# Patient Record
Sex: FEMALE | Race: WHITE
[De-identification: names, ages, dates, MRNs, and addresses within clinical notes are randomized per-mention and may not be internally consistent; named-entity substitution may affect disease eponyms.]

---

## 2018-04-18 ENCOUNTER — HOSPITAL ENCOUNTER (EMERGENCY)
Dept: HOSPITAL 8 - ED | Age: 33
Discharge: HOME | End: 2018-04-18
Payer: COMMERCIAL

## 2018-04-18 VITALS — DIASTOLIC BLOOD PRESSURE: 89 MMHG | SYSTOLIC BLOOD PRESSURE: 149 MMHG

## 2018-04-18 VITALS — WEIGHT: 199.3 LBS | BODY MASS INDEX: 29.52 KG/M2 | HEIGHT: 69 IN

## 2018-04-18 DIAGNOSIS — S61.431A: Primary | ICD-10-CM
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
## 2018-04-18 PROCEDURE — 90715 TDAP VACCINE 7 YRS/> IM: CPT

## 2018-04-18 PROCEDURE — 99283 EMERGENCY DEPT VISIT LOW MDM: CPT

## 2018-04-18 PROCEDURE — 96372 THER/PROPH/DIAG INJ SC/IM: CPT

## 2020-07-21 ENCOUNTER — OFFICE VISIT (OUTPATIENT)
Dept: MEDICAL GROUP | Facility: PHYSICIAN GROUP | Age: 35
End: 2020-07-21
Payer: COMMERCIAL

## 2020-07-21 VITALS
SYSTOLIC BLOOD PRESSURE: 118 MMHG | DIASTOLIC BLOOD PRESSURE: 76 MMHG | HEART RATE: 77 BPM | TEMPERATURE: 98.9 F | OXYGEN SATURATION: 97 % | WEIGHT: 207 LBS | BODY MASS INDEX: 30.66 KG/M2 | HEIGHT: 69 IN

## 2020-07-21 DIAGNOSIS — F41.9 ANXIETY: ICD-10-CM

## 2020-07-21 DIAGNOSIS — Z87.42 HISTORY OF ABNORMAL CERVICAL PAP SMEAR: ICD-10-CM

## 2020-07-21 DIAGNOSIS — Z87.42 HISTORY OF OVARIAN CYST: ICD-10-CM

## 2020-07-21 DIAGNOSIS — R07.89 CHEST DISCOMFORT: ICD-10-CM

## 2020-07-21 DIAGNOSIS — Z97.5 IUD (INTRAUTERINE DEVICE) IN PLACE: ICD-10-CM

## 2020-07-21 DIAGNOSIS — Z86.59 HISTORY OF DEPRESSION: ICD-10-CM

## 2020-07-21 PROCEDURE — 99204 OFFICE O/P NEW MOD 45 MIN: CPT | Performed by: FAMILY MEDICINE

## 2020-07-21 SDOH — HEALTH STABILITY: MENTAL HEALTH: HOW MANY STANDARD DRINKS CONTAINING ALCOHOL DO YOU HAVE ON A TYPICAL DAY?: 1 OR 2

## 2020-07-21 SDOH — HEALTH STABILITY: MENTAL HEALTH: HOW OFTEN DO YOU HAVE A DRINK CONTAINING ALCOHOL?: 2-3 TIMES A WEEK

## 2020-07-21 ASSESSMENT — PATIENT HEALTH QUESTIONNAIRE - PHQ9: CLINICAL INTERPRETATION OF PHQ2 SCORE: 0

## 2020-07-21 NOTE — PROGRESS NOTES
"CC: Anxiety, chest discomfort    HISTORY OF THE PRESENT ILLNESS: Patient is a 34 y.o. female. This pleasant patient is here today to establish care and discuss the following issues.    Patient is here today to establish care.  She is hoping to get a Pap smear soon as well.  In reviewing her health history:    She does have a history of abnormal Pap smear maybe 10 years ago.  Reports consecutive Paps since that time have been normal.  No concerns or symptoms from a well woman standpoint at this time.  She does have an IUD in place (ParaGard).  She also has a history of a ruptured ovarian cyst.  No recent issues with that.    Notes that her biggest health concern currently is anxiety and depression.  She reports that many years ago she was actually seriously depressed and admitted to [facility].  At that time she was on antidepressants.  States that she weaned herself off of these several years ago.  Does not desire to go back on antidepressants or any psychiatric medication.  She is now however experiencing significant anxiety.  She was previously seeing a counselor in [place] where she lived, but is not seeing one here.  Again, she strongly desires not to do medications and prefers natural treatments such as meditation, self awareness.  She does note that she gets \"heart pain\", just before panic attacks.  She told this to previous doctor and never had an EKG or any work-up.  Just describes it as a heavy chest discomfort which typically precedes a severe panic attack.  Denies any other associated symptoms other than shortness of breath as the panic attack progresses.    Allergies: Patient has no known allergies.    No current [facility]-ordered outpatient medications on file.     No current [facility]-ordered facility-administered medications on file.        History reviewed. No pertinent past medical history.    History reviewed. No pertinent surgical history.    Social History     Tobacco Use   • Smoking " "status: Former Smoker   • Smokeless tobacco: Never Used   Substance Use Topics   • Alcohol use: Yes     Frequency: 2-3 times a week     Drinks per session: 1 or 2   • Drug use: Not Currently       Social History     Social History Narrative   • Not on file       Family History   Problem Relation Age of Onset   • No Known Problems Mother    • Diabetes Father         type II   • Depression Father    • Cancer Maternal Grandmother         colon cancer   • Cancer Maternal Grandfather         colon cancer       ROS:     - Constitutional: Negative for fever, chills, unexpected weight change, and fatigue/generalized weakness.     - HEENT: Negative for headaches, vision changes, hearing changes, ear pain, ear discharge, rhinorrhea, sinus congestion, sore throat, and neck pain.      - Respiratory: Negative for cough, sputum production, chest congestion, dyspnea, wheezing, and crackles.      - Cardiovascular: Positive for chest discomfort.  Negative for  palpitations, orthopnea, PND, and bilateral lower extremity edema.     - Gastrointestinal: Negative for heartburn, nausea, vomiting, abdominal pain, hematochezia, melena, diarrhea, constipation, and greasy/foul-smelling stools.     - Genitourinary: Negative for dysuria, polyuria, hematuria, pyuria, urinary urgency, and urinary incontinence.     - Musculoskeletal: Negative for myalgias, back pain, and joint pain.     - Skin: Negative for rash, itching, cyanotic skin color change.       - NOTE: All other systems reviewed and are negative, except as in HPI.    Exam: /76 (BP Location: Left arm, Patient Position: Sitting, BP Cuff Size: Adult)   Pulse 77   Temp 37.2 °C (98.9 °F) (Temporal)   Ht 1.753 m (5' 9\")   Wt 93.9 kg (207 lb)   SpO2 97%  Body mass index is 30.57 kg/m².    General: Well appearing, NAD  HEENT: Normocephalic. Conjunctiva clear, lids without ptosis, pupils equal and reactive to light accommodation, ears normal shape and contour, canals are clear " bilaterally, tympanic membranes without bulging or erythema and normal cone of light  Neck: Supple without JVD. No thyromegaly or nodules  Pulmonary: Clear to ausculation.  Normal effort. No rales, ronchi, or wheezing.  Cardiovascular: Regular rate and rhythm without murmur, rubs or gallop.   Abdomen: Soft, nontender, nondistended. Normal bowel sounds. Liver and spleen are not palpable. No rebound or guarding  Neurologic: normal gait  Lymph: No cervical, supraclavicular lymph nodes are palpable  Skin: Warm and dry.  No obvious lesions.  Musculoskeletal:  No extremity cyanosis, clubbing, or edema.  Psych: Normal mood and affect. Alert and oriented. Judgment and insight is normal.    Please note that this dictation was created using voice recognition software. I have made every reasonable attempt to correct obvious errors, but I expect that there are errors of grammar and possibly content that I did not discover before finalizing the note.      Assessment/Plan  Joycelyn was seen today for establish care, annual exam and anxiety.    Diagnoses and all orders for this visit:    Anxiety  History of depression  Chronic issues, more so anxiety than depression at this time.  Strongly prefers to stay away from medications.  Recommend seeing if she can reconnect with her previous therapist, possibly be a virtual visit since those are widely available right now.  Also discussed various techniques which may improve relaxation or help stop panic attacks.    History of abnormal cervical Pap smear  History of ovarian cyst  IUD (intrauterine device) in place  Patient will return in about 1 to 2 weeks for Pap smear.    Chest discomfort  Immediately proceeds panic attacks.  I feel patient would have some reassurance from a normal EKG, which it was.  I do suspect anxiety related.  We will continue to monitor.    -     EKG    EKG Interpretation 7/21/2020  Interpreted by me   Rhythm: normal sinus   Rate: 71   Axis: normal  Ectopy: none    Conduction: normal   ST Segments: no acute change   T Waves: no acute change   Q Waves: none   Clinical Impression: no acute changes and normal EKG    See EKG scanned chart.     Follow up in one week for pap smear.     Nancy Dillard DO  [facility]

## 2020-07-28 ENCOUNTER — OFFICE VISIT (OUTPATIENT)
Dept: MEDICAL GROUP | Facility: PHYSICIAN GROUP | Age: 35
End: 2020-07-28
Payer: COMMERCIAL

## 2020-07-28 ENCOUNTER — HOSPITAL ENCOUNTER (OUTPATIENT)
Facility: MEDICAL CENTER | Age: 35
End: 2020-07-28
Attending: FAMILY MEDICINE
Payer: COMMERCIAL

## 2020-07-28 VITALS
HEIGHT: 69 IN | OXYGEN SATURATION: 97 % | BODY MASS INDEX: 30.07 KG/M2 | HEART RATE: 86 BPM | TEMPERATURE: 97.2 F | WEIGHT: 203 LBS | DIASTOLIC BLOOD PRESSURE: 64 MMHG | SYSTOLIC BLOOD PRESSURE: 116 MMHG

## 2020-07-28 DIAGNOSIS — Z12.4 PAP SMEAR FOR CERVICAL CANCER SCREENING: ICD-10-CM

## 2020-07-28 PROCEDURE — 87624 HPV HI-RISK TYP POOLED RSLT: CPT

## 2020-07-28 PROCEDURE — 99395 PREV VISIT EST AGE 18-39: CPT | Performed by: FAMILY MEDICINE

## 2020-07-28 PROCEDURE — 88175 CYTOPATH C/V AUTO FLUID REDO: CPT

## 2020-07-28 NOTE — PROGRESS NOTES
"CC: Pap smear    HISTORY OF THE PRESENT ILLNESS: Patient is a 34 y.o. female. This pleasant patient is here today for Pap smear and well woman exam.    Patient is here today for Pap smear and well woman exam.  She has no gynecologic concerns at this time.  She does have a history of ovarian cyst, but has not had any issues with that for quite some time.  Denies pelvic pain.  She does have a copper IUD in place.  Menstrual cycles are somewhat heavy but regular and not painful.  She does note an abnormal Pap smear several years ago, but reports that she had normal 1 since that time.  Denies immediate family history of breast or gynecologic cancers.  Denies pelvic pain, dysuria, vaginal discharge.    Allergies: Patient has no known allergies.    No current [facility]-ordered outpatient medications on file.     No current [facility]-ordered facility-administered medications on file.        History reviewed. No pertinent past medical history.    History reviewed. No pertinent surgical history.    Social History     Tobacco Use   • Smoking status: Former Smoker   • Smokeless tobacco: Never Used   Substance Use Topics   • Alcohol use: Yes     Frequency: 2-3 times a week     Drinks per session: 1 or 2   • Drug use: Not Currently       Social History     Social History Narrative   • Not on file       Family History   Problem Relation Age of Onset   • No Known Problems Mother    • Diabetes Father         type II   • Depression Father    • Cancer Maternal Grandmother         colon cancer   • Cancer Maternal Grandfather         colon cancer       ROS:   See HPI    Exam: /64 (BP Location: Right arm, Patient Position: Sitting, BP Cuff Size: Adult)   Pulse 86   Temp 36.2 °C (97.2 °F) (Temporal)   Ht 1.753 m (5' 9\")   Wt 92.1 kg (203 lb)   SpO2 97%  Body mass index is 29.98 kg/m².    General: Well appearing, NAD  Breast: Breasts normal in appearance.  No palpable lumps or masses.  No axillary lymphadenopathy.  : Normal external " genitalia.  Normal cervix and vagina.  No cervical motion tenderness, adnexal tenderness, adnexal masses.  IUD strings noted at cervical os.   Skin: Warm and dry.  No obvious lesions.  Musculoskeletal:  No extremity cyanosis, clubbing, or edema.  Psych: Normal mood and affect. Alert and oriented. Judgment and insight is normal.    Please note that this dictation was created using voice recognition software. I have made every reasonable attempt to correct obvious errors, but I expect that there are errors of grammar and possibly content that I did not discover before finalizing the note.      Assessment/Plan  Joycelyn was seen today for gynecologic exam.    Diagnoses and all orders for this visit:    Pap smear for cervical cancer screening  Pap smear and well woman exam performed today.  No concerns on review of systems or exam.  -     THINPREP PAP WITH HPV; Future      Follow-up in 1 year or sooner if needed.    Nancy Dillard,   [facility]

## 2020-07-29 LAB
CYTOLOGY REG CYTOL: NORMAL
HPV HR 12 DNA CVX QL NAA+PROBE: NEGATIVE
HPV16 DNA SPEC QL NAA+PROBE: NEGATIVE
HPV18 DNA SPEC QL NAA+PROBE: NEGATIVE
SPECIMEN SOURCE: NORMAL

## 2020-07-31 ENCOUNTER — TELEPHONE (OUTPATIENT)
Dept: MEDICAL GROUP | Facility: PHYSICIAN GROUP | Age: 35
End: 2020-07-31

## 2020-07-31 NOTE — TELEPHONE ENCOUNTER
----- Message from Nancy Dillard D.O. sent at 7/31/2020  7:50 AM PDT -----  Please call patient and let her know her Pap smear was normal.

## 2021-05-23 ENCOUNTER — OFFICE VISIT (OUTPATIENT)
Dept: URGENT CARE | Facility: PHYSICIAN GROUP | Age: 36
End: 2021-05-23
Payer: COMMERCIAL

## 2021-05-23 VITALS
HEART RATE: 91 BPM | DIASTOLIC BLOOD PRESSURE: 80 MMHG | TEMPERATURE: 99.6 F | HEIGHT: 69 IN | SYSTOLIC BLOOD PRESSURE: 138 MMHG | RESPIRATION RATE: 16 BRPM | WEIGHT: 210 LBS | BODY MASS INDEX: 31.1 KG/M2 | OXYGEN SATURATION: 100 %

## 2021-05-23 DIAGNOSIS — R20.2 NUMBNESS AND TINGLING SENSATION OF SKIN: ICD-10-CM

## 2021-05-23 DIAGNOSIS — R20.0 NUMBNESS AND TINGLING SENSATION OF SKIN: ICD-10-CM

## 2021-05-23 LAB — GLUCOSE BLD-MCNC: 94 MG/DL (ref 70–100)

## 2021-05-23 PROCEDURE — 99213 OFFICE O/P EST LOW 20 MIN: CPT | Performed by: FAMILY MEDICINE

## 2021-05-23 PROCEDURE — 82962 GLUCOSE BLOOD TEST: CPT | Performed by: FAMILY MEDICINE

## 2021-05-23 NOTE — PROGRESS NOTES
"Subjective:      Joycelyn Awan is a 35 y.o. female who presents with Head Pain (pins and needle feeling all over body and head, x3 weeks pinching feeling x1 month )    - This is a pleasant and nontoxic appearing 35 y.o. female with c/o 4 wks w/ intermittent pinching feeling on top of head and over past 2 wks feels random spots of body tingling, jumps around from one past of body to another. Otherwise feels well except maybe a little forgetful foggy headed and maybe some increased recent stress. No associated NVFC, no focal limb weakness numbness heaviness or vision changes. No recent head trauma.       ALLERGIES:  Patient has no known allergies.     PMH:  History reviewed. No pertinent past medical history.     PSH:  History reviewed. No pertinent surgical history.    MEDS:  No current outpatient medications on file.    ** I have documented what I find to be significant in regards to past medical, social, family and surgical history  in my HPI or under PMH/PSH/FH review section, otherwise it is noncontributory **             HPI    Review of Systems   All other systems reviewed and are negative.         Objective:     /80 (BP Location: Right arm, Patient Position: Sitting, BP Cuff Size: Adult)   Pulse 91   Temp 37.6 °C (99.6 °F) (Temporal)   Resp 16   Ht 1.753 m (5' 9\")   Wt 95.3 kg (210 lb)   SpO2 100%   BMI 31.01 kg/m²      Physical Exam  Vitals and nursing note reviewed.   Constitutional:       General: She is not in acute distress.     Appearance: Normal appearance. She is well-developed.   HENT:      Head: Normocephalic and atraumatic.   Eyes:      General: No scleral icterus.  Cardiovascular:      Heart sounds: Normal heart sounds. No murmur heard.     Pulmonary:      Effort: Pulmonary effort is normal. No respiratory distress.      Breath sounds: Normal breath sounds.   Skin:     Coloration: Skin is not jaundiced or pale.   Neurological:      Mental Status: She is alert.      Motor: No " abnormal muscle tone.   Psychiatric:         Mood and Affect: Mood normal.         Behavior: Behavior normal.          Assessment/Plan:          1. Numbness and tingling sensation of skin  REFERRAL TO NEUROLOGY    CBC WITH DIFFERENTIAL    Comp Metabolic Panel    TSH WITH REFLEX TO FT4    POCT Glucose       - Dx, plan & d/c instructions discussed w/ patient and/or family members  - Rest, stay hydrated   - E.R. precautions discussed     Follow up with their PCP in 2-3 days, ER if not improving or feeling/getting worse.    Any realistic side effects of medications that may have been given today reviewed.     Patient left in stable condition       Please note that this dictation was created using voice recognition software. I have made every reasonable attempt to correct obvious errors, but I expect that there are errors of grammar and possibly content that I did not discover before finalizing the note.

## 2021-05-24 ENCOUNTER — HOSPITAL ENCOUNTER (OUTPATIENT)
Dept: LAB | Facility: MEDICAL CENTER | Age: 36
End: 2021-05-24
Attending: FAMILY MEDICINE
Payer: COMMERCIAL

## 2021-05-24 DIAGNOSIS — R20.0 NUMBNESS AND TINGLING SENSATION OF SKIN: ICD-10-CM

## 2021-05-24 DIAGNOSIS — R20.2 NUMBNESS AND TINGLING SENSATION OF SKIN: ICD-10-CM

## 2021-05-24 LAB
ALBUMIN SERPL BCP-MCNC: 4.2 G/DL (ref 3.2–4.9)
ALBUMIN/GLOB SERPL: 1.5 G/DL
ALP SERPL-CCNC: 43 U/L (ref 30–99)
ALT SERPL-CCNC: 12 U/L (ref 2–50)
ANION GAP SERPL CALC-SCNC: 8 MMOL/L (ref 7–16)
AST SERPL-CCNC: 12 U/L (ref 12–45)
BASOPHILS # BLD AUTO: 0.4 % (ref 0–1.8)
BASOPHILS # BLD: 0.02 K/UL (ref 0–0.12)
BILIRUB SERPL-MCNC: 0.4 MG/DL (ref 0.1–1.5)
BUN SERPL-MCNC: 10 MG/DL (ref 8–22)
CALCIUM SERPL-MCNC: 9 MG/DL (ref 8.5–10.5)
CHLORIDE SERPL-SCNC: 107 MMOL/L (ref 96–112)
CO2 SERPL-SCNC: 24 MMOL/L (ref 20–33)
CREAT SERPL-MCNC: 0.76 MG/DL (ref 0.5–1.4)
EOSINOPHIL # BLD AUTO: 0.08 K/UL (ref 0–0.51)
EOSINOPHIL NFR BLD: 1.6 % (ref 0–6.9)
ERYTHROCYTE [DISTWIDTH] IN BLOOD BY AUTOMATED COUNT: 47.2 FL (ref 35.9–50)
GLOBULIN SER CALC-MCNC: 2.8 G/DL (ref 1.9–3.5)
GLUCOSE SERPL-MCNC: 97 MG/DL (ref 65–99)
HCT VFR BLD AUTO: 46.4 % (ref 37–47)
HGB BLD-MCNC: 15.1 G/DL (ref 12–16)
IMM GRANULOCYTES # BLD AUTO: 0.01 K/UL (ref 0–0.11)
IMM GRANULOCYTES NFR BLD AUTO: 0.2 % (ref 0–0.9)
LYMPHOCYTES # BLD AUTO: 1.74 K/UL (ref 1–4.8)
LYMPHOCYTES NFR BLD: 35.8 % (ref 22–41)
MCH RBC QN AUTO: 32.5 PG (ref 27–33)
MCHC RBC AUTO-ENTMCNC: 32.5 G/DL (ref 33.6–35)
MCV RBC AUTO: 100 FL (ref 81.4–97.8)
MONOCYTES # BLD AUTO: 0.33 K/UL (ref 0–0.85)
MONOCYTES NFR BLD AUTO: 6.8 % (ref 0–13.4)
NEUTROPHILS # BLD AUTO: 2.68 K/UL (ref 2–7.15)
NEUTROPHILS NFR BLD: 55.2 % (ref 44–72)
NRBC # BLD AUTO: 0 K/UL
NRBC BLD-RTO: 0 /100 WBC
PLATELET # BLD AUTO: 169 K/UL (ref 164–446)
PMV BLD AUTO: 12.2 FL (ref 9–12.9)
POTASSIUM SERPL-SCNC: 4.4 MMOL/L (ref 3.6–5.5)
PROT SERPL-MCNC: 7 G/DL (ref 6–8.2)
RBC # BLD AUTO: 4.64 M/UL (ref 4.2–5.4)
SODIUM SERPL-SCNC: 139 MMOL/L (ref 135–145)
TSH SERPL DL<=0.005 MIU/L-ACNC: 1.22 UIU/ML (ref 0.38–5.33)
WBC # BLD AUTO: 4.9 K/UL (ref 4.8–10.8)

## 2021-05-24 PROCEDURE — 84443 ASSAY THYROID STIM HORMONE: CPT

## 2021-05-24 PROCEDURE — 85025 COMPLETE CBC W/AUTO DIFF WBC: CPT

## 2021-05-24 PROCEDURE — 80053 COMPREHEN METABOLIC PANEL: CPT

## 2021-05-24 PROCEDURE — 36415 COLL VENOUS BLD VENIPUNCTURE: CPT

## 2021-05-26 ENCOUNTER — APPOINTMENT (OUTPATIENT)
Dept: RADIOLOGY | Facility: MEDICAL CENTER | Age: 36
End: 2021-05-26
Attending: EMERGENCY MEDICINE
Payer: COMMERCIAL

## 2021-05-26 ENCOUNTER — HOSPITAL ENCOUNTER (EMERGENCY)
Facility: MEDICAL CENTER | Age: 36
End: 2021-05-26
Attending: EMERGENCY MEDICINE
Payer: COMMERCIAL

## 2021-05-26 VITALS
DIASTOLIC BLOOD PRESSURE: 76 MMHG | TEMPERATURE: 99 F | SYSTOLIC BLOOD PRESSURE: 125 MMHG | WEIGHT: 210 LBS | BODY MASS INDEX: 31.1 KG/M2 | OXYGEN SATURATION: 99 % | HEART RATE: 78 BPM | RESPIRATION RATE: 16 BRPM | HEIGHT: 69 IN

## 2021-05-26 DIAGNOSIS — R20.2 PARESTHESIAS: ICD-10-CM

## 2021-05-26 DIAGNOSIS — R51.9 ACUTE INTRACTABLE HEADACHE, UNSPECIFIED HEADACHE TYPE: ICD-10-CM

## 2021-05-26 PROCEDURE — 99283 EMERGENCY DEPT VISIT LOW MDM: CPT

## 2021-05-26 PROCEDURE — A9270 NON-COVERED ITEM OR SERVICE: HCPCS | Performed by: EMERGENCY MEDICINE

## 2021-05-26 PROCEDURE — 70551 MRI BRAIN STEM W/O DYE: CPT

## 2021-05-26 PROCEDURE — 700102 HCHG RX REV CODE 250 W/ 637 OVERRIDE(OP): Performed by: EMERGENCY MEDICINE

## 2021-05-26 RX ORDER — DIAZEPAM 5 MG/1
5 TABLET ORAL ONCE
Status: COMPLETED | OUTPATIENT
Start: 2021-05-26 | End: 2021-05-26

## 2021-05-26 RX ORDER — ACETAMINOPHEN 325 MG/1
975 TABLET ORAL ONCE
Status: COMPLETED | OUTPATIENT
Start: 2021-05-26 | End: 2021-05-26

## 2021-05-26 RX ADMIN — DIAZEPAM 5 MG: 5 TABLET ORAL at 16:30

## 2021-05-26 RX ADMIN — ACETAMINOPHEN 975 MG: 325 TABLET, FILM COATED ORAL at 15:50

## 2021-05-26 ASSESSMENT — FIBROSIS 4 INDEX: FIB4 SCORE: 0.74

## 2021-05-26 ASSESSMENT — PAIN DESCRIPTION - PAIN TYPE: TYPE: ACUTE PAIN

## 2021-05-26 NOTE — ED PROVIDER NOTES
"ED Provider Note    CHIEF COMPLAINT  Chief Complaint   Patient presents with   • Headache     generalized pressure x 1 month intermittent. reports whole body tingling.       HPI  Joycelyn Awan is a 35 y.o. female who presents with past medical history significant for anxiety.  She for 1 month has had headaches that are intermittent and various paresthesias in her body.  She describes feeling numbness and tingling in different parts and then occasionally visual disturbances.  The patient denies any focal strokelike symptoms in 1 part of her body that is persistent.  The patient is concerned that she may have multiple sclerosis.  She denies any fever or other symptoms.  The patient was referred to both a psychiatrist for anxiety and to a neurologist but she has not been able to establish an appointment with either at this time.    REVIEW OF SYSTEMS  See HPI for further details. All other systems are negative.     PAST MEDICAL HISTORY   Anxiety    SOCIAL HISTORY  Social History     Tobacco Use   • Smoking status: Former Smoker   • Smokeless tobacco: Never Used   Vaping Use   • Vaping Use: Never used   Substance and Sexual Activity   • Alcohol use: Yes   • Drug use: Not Currently   • Sexual activity: Not on file       SURGICAL HISTORY  patient denies any surgical history    CURRENT MEDICATIONS  Home Medications     Reviewed by Malia Chahal R.N. (Registered Nurse) on 05/26/21 at 1322  Med List Status: Not Addressed   Medication Last Dose Status        Patient Francisco Taking any Medications                       ALLERGIES  No Known Allergies    PHYSICAL EXAM  VITAL SIGNS: /98   Pulse 87   Temp 36.9 °C (98.5 °F) (Temporal)   Resp 16   Ht 1.753 m (5' 9\")   Wt 95.3 kg (210 lb)   SpO2 100%   BMI 31.01 kg/m²  @TRAN[[id number]::@   Pulse ox interpretation: I interpret this pulse ox as normal.  Constitutional: Alert.  HENT: No signs of trauma, Bilateral external ears normal, Nose normal.   Eyes: Pupils are " equal and reactive, Conjunctiva normal, Non-icteric.   Neck: Normal range of motion, No tenderness, Supple, No stridor.   Lymphatic: No lymphadenopathy noted.   Cardiovascular: Regular rate and rhythm, no murmurs.   Thorax & Lungs: Normal breath sounds, No respiratory distress, No wheezing, No chest tenderness.   Abdomen: Bowel sounds normal, Soft, No tenderness, No masses, No pulsatile masses. No peritoneal signs.  Skin: Warm, Dry, No erythema, No rash.   Back: No bony tenderness, No CVA tenderness.   Extremities: Intact distal pulses, No edema, No tenderness, No cyanosis.  Musculoskeletal: Good range of motion in all major joints. No tenderness to palpation or major deformities noted.   Neurologic: Alert , Normal motor function, Normal sensory function, No focal deficits noted.   Psychiatric: Affect normal, Judgment normal, Mood normal.       DIAGNOSTIC STUDIES / PROCEDURES        RADIOLOGY  MR-BRAIN-W/O    (Results Pending)           COURSE & MEDICAL DECISION MAKING  Pertinent Labs & Imaging studies reviewed. (See chart for details)    Differential diagnosis: Multiple sclerosis or other demyelinating disease, anxiety    I reviewed the patient's chart, the patient 2 days ago on May 24, 2021 had a normal CBC, a normal CMP, and normal thyroid test.    I have ordered an MRI to assess the patient for possible demyelination disease.  She is given Tylenol 975 mg p.o. for headache.    MRI brain is pending, the case is signed out to Dr. Marta Cowart for final disposition.      FINAL IMPRESSION  1.  Headache and paresthesias and visual disturbance  2.   3.         Electronically signed by: Zach Millan M.D., 5/26/2021 3:05 PM

## 2021-05-26 NOTE — ED TRIAGE NOTES
"Chief Complaint   Patient presents with   • Headache     generalized pressure x 1 month intermittent. reports whole body tingling.       Pt amb to triage with steady gait for above complaint. Neuro intact. Reports headaches began every few days and now reports they are daily. Denies photophobia, N/V.    Referred to neurology, waiting for insurance approval.     Pt is alert and oriented, speaking in full sentences, follows commands and responds appropriately to questions. Resp are even and unlabored. No behavioral indicators of pain.   Pt placed in lobby. Pt educated on triage process. Pt encouraged to alert staff for any changes.    /94   Pulse 87   Temp 36.9 °C (98.5 °F) (Temporal)   Resp 16   Ht 1.753 m (5' 9\")   Wt 95.3 kg (210 lb)   SpO2 100%   BMI 31.01 kg/m²         "

## 2021-05-27 NOTE — DISCHARGE INSTRUCTIONS
Follow-up with neurology and psychiatry as scheduled    Follow-up with your primary care doctor    Return to the ER for worsening symptoms

## 2021-05-27 NOTE — ED PROVIDER NOTES
6:48 PM  I received signout from Dr. Blackwell who asked me to follow-up on MRI results.  MRI is negative for acute pathology.  Patient will be discharged.

## 2021-05-27 NOTE — ED NOTES
Patient discharged. Patient provided information about headaches. Pt educated to follow-up w/ neurology, PCP, and to return to the ER w/ any worsening symptoms. Pt walked to the lobby w/ spouse.

## 2021-06-04 ENCOUNTER — OFFICE VISIT (OUTPATIENT)
Dept: MEDICAL GROUP | Facility: PHYSICIAN GROUP | Age: 36
End: 2021-06-04
Payer: COMMERCIAL

## 2021-06-04 VITALS
TEMPERATURE: 99 F | HEIGHT: 69 IN | HEART RATE: 93 BPM | OXYGEN SATURATION: 97 % | BODY MASS INDEX: 30.66 KG/M2 | SYSTOLIC BLOOD PRESSURE: 122 MMHG | DIASTOLIC BLOOD PRESSURE: 64 MMHG | WEIGHT: 207 LBS

## 2021-06-04 DIAGNOSIS — R51.9 NONINTRACTABLE EPISODIC HEADACHE, UNSPECIFIED HEADACHE TYPE: ICD-10-CM

## 2021-06-04 DIAGNOSIS — H53.8 BLURRY VISION: ICD-10-CM

## 2021-06-04 DIAGNOSIS — R20.2 PINS AND NEEDLES SENSATION: ICD-10-CM

## 2021-06-04 PROCEDURE — 99213 OFFICE O/P EST LOW 20 MIN: CPT | Performed by: FAMILY MEDICINE

## 2021-06-04 ASSESSMENT — PATIENT HEALTH QUESTIONNAIRE - PHQ9: CLINICAL INTERPRETATION OF PHQ2 SCORE: 0

## 2021-06-04 ASSESSMENT — FIBROSIS 4 INDEX: FIB4 SCORE: 0.74

## 2021-06-04 NOTE — PROGRESS NOTES
"CC: Headache, pins-and-needles sensation, blurry vision    HISTORY OF THE PRESENT ILLNESS: Patient is a 35 y.o. female.     Patient notes in the past several weeks she has developed symptoms of pins-and-needles at random points on her body in addition to headache and most recently blurry vision.  She has been seen at both urgent care and emergency department.  She had labs which included CBC, CMP and thyroid studies which were normal.  She also had brain MRI which was normal.  She was told in the emergency department that she is likely experiencing migraines.    She subsequently saw a naturopath who told her that her symptoms were related to mold.    She questions whether or not migraines are not appropriate diagnosis as she feels that there must be some underlying other cause for her symptoms.  She notes she has been under increased stress lately and has been seeing a therapist.    Neurology referral    Allergies: Patient has no known allergies.    No current [facility]-ordered outpatient medications on file.     No current [facility]-ordered facility-administered medications on file.       Past Medical History:   Diagnosis Date   • Functional ovarian cysts        History reviewed. No pertinent surgical history.    Social History     Tobacco Use   • Smoking status: Former Smoker   • Smokeless tobacco: Never Used   Vaping Use   • Vaping Use: Never used   Substance Use Topics   • Alcohol use: Yes   • Drug use: Not Currently     Types: Marijuana     Comment: passive       Family History   Problem Relation Age of Onset   • No Known Problems Mother    • Diabetes Father         type II   • Depression Father    • Cancer Maternal Grandmother         colon cancer   • Cancer Maternal Grandfather         colon cancer   • Lung Disease Father        ROS:   See HPI    Exam: /64 (BP Location: Left arm, Patient Position: Sitting, BP Cuff Size: Large adult)   Pulse 93   Temp 37.2 °C (99 °F) (Temporal)   Ht 1.753 m (5' 9\")   Wt 93.9 " kg (207 lb)   SpO2 97%  Body mass index is 30.57 kg/m².    General: Well appearing, NAD  Skin: Warm and dry.  No obvious lesions.   Musculoskeletal:  No extremity cyanosis, clubbing, or edema.  Psych: Normal mood and affect. Alert and oriented. Judgment and insight is normal.    Please note that this dictation was created using voice recognition software. I have made every reasonable attempt to correct obvious errors, but I expect that there are errors of grammar and possibly content that I did not discover before finalizing the note.      Assessment/Plan  Joycelyn was seen today for follow-up.    Diagnoses and all orders for this visit:    Nonintractable episodic headache, unspecified headache type  Blurry vision  Pins and needles sensation   New problems, symptoms very consistent with migraine.  She was referred to neurology by urgent care but has not yet been able to schedule due to possible change of insurance at the end of this month.  We will call the referral department to find out what is going on.  Patient believes that symptoms are either related to mold per naturopath or that something else underlying may be causing the symptoms.  We did discuss that onset of migraines is most often random.  We also discussed triggers for migraines such as weather, dietary intake, stress, menstrual cycles.  She may keep a log of her symptoms to see if she can identify any triggers.  She states she does not like to take medication so declines any medication for treatment of migraine.  We did talk about medication such as Imitrex, if she changes her mind she can send me a [employer] message and I will send in the prescription.    Follow up as needed.     My total time spent caring for the patient on the day of the encounter was 25 minutes.     This does not include time spent on separately billable procedures/tests.      Nancy Dillard,   [facility]

## 2021-09-01 ENCOUNTER — OFFICE VISIT (OUTPATIENT)
Dept: URGENT CARE | Facility: CLINIC | Age: 36
End: 2021-09-01

## 2021-09-01 VITALS
HEIGHT: 69 IN | WEIGHT: 210 LBS | BODY MASS INDEX: 31.1 KG/M2 | SYSTOLIC BLOOD PRESSURE: 114 MMHG | OXYGEN SATURATION: 98 % | TEMPERATURE: 98.3 F | HEART RATE: 92 BPM | RESPIRATION RATE: 16 BRPM | DIASTOLIC BLOOD PRESSURE: 82 MMHG

## 2021-09-01 DIAGNOSIS — J06.9 URI, ACUTE: ICD-10-CM

## 2021-09-01 DIAGNOSIS — J01.00 ACUTE MAXILLARY SINUSITIS, RECURRENCE NOT SPECIFIED: ICD-10-CM

## 2021-09-01 PROCEDURE — 99213 OFFICE O/P EST LOW 20 MIN: CPT | Performed by: NURSE PRACTITIONER

## 2021-09-01 RX ORDER — AMOXICILLIN AND CLAVULANATE POTASSIUM 875; 125 MG/1; MG/1
1 TABLET, FILM COATED ORAL 2 TIMES DAILY
Qty: 14 TABLET | Refills: 0 | Status: SHIPPED | OUTPATIENT
Start: 2021-09-01 | End: 2021-09-08

## 2021-09-01 RX ORDER — ALBUTEROL SULFATE 90 UG/1
2 AEROSOL, METERED RESPIRATORY (INHALATION) EVERY 6 HOURS PRN
Qty: 8.5 G | Refills: 1 | Status: SHIPPED | OUTPATIENT
Start: 2021-09-01 | End: 2022-08-03

## 2021-09-01 ASSESSMENT — FIBROSIS 4 INDEX: FIB4 SCORE: 0.74

## 2021-09-01 ASSESSMENT — ENCOUNTER SYMPTOMS: COUGH: 1

## 2021-09-01 NOTE — PROGRESS NOTES
Subjective     Joycelyn Awan is a 35 y.o. female who presents with Cough (x1 wk ), Nasal Congestion (runny nose ), and Sore Throat     Past Medical History:   Diagnosis Date   • Functional ovarian cysts      Social History     Socioeconomic History   • Marital status:      Spouse name: Not on file   • Number of children: Not on file   • Years of education: Not on file   • Highest education level: Not on file   Occupational History   • Not on file   Tobacco Use   • Smoking status: Former Smoker   • Smokeless tobacco: Never Used   Vaping Use   • Vaping Use: Never used   Substance and Sexual Activity   • Alcohol use: Yes   • Drug use: Not Currently     Types: Marijuana     Comment: passive   • Sexual activity: Yes     Partners: Male     Birth control/protection: I.U.D., Pill   Other Topics Concern   • Not on file   Social History Narrative    ** Merged History Encounter **          Social Determinants of Health     Financial Resource Strain:    • Difficulty of Paying Living Expenses:    Food Insecurity:    • Worried About Running Out of Food in the Last Year:    • Ran Out of Food in the Last Year:    Transportation Needs:    • Lack of Transportation (Medical):    • Lack of Transportation (Non-Medical):    Physical Activity:    • Days of Exercise per Week:    • Minutes of Exercise per Session:    Stress:    • Feeling of Stress :    Social Connections:    • Frequency of Communication with Friends and Family:    • Frequency of Social Gatherings with Friends and Family:    • Attends [employer] Services:    • Active Member of Clubs or Organizations:    • Attends Club or Organization Meetings:    • Marital Status:    Intimate Partner Violence:    • Fear of Current or Ex-Partner:    • Emotionally Abused:    • Physically Abused:    • Sexually Abused:      Family History   Problem Relation Age of Onset   • No Known Problems Mother    • Diabetes Father         type II   • Depression Father    • Cancer Maternal  "Grandmother         colon cancer   • Cancer Maternal Grandfather         colon cancer   • Lung Disease Father        Allergies: Patient has no known allergies.              Cough  This is a new problem. The current episode started in the past 7 days. The problem has been unchanged. The problem occurs every few minutes. Nothing aggravates the symptoms. She has tried nothing for the symptoms. The treatment provided no relief.       Review of Systems   Respiratory: Positive for cough.    All other systems reviewed and are negative.    Patient is a 35-year-old female who presents today with complaint of sinus pain, pressure, drainage, and congestion.  Drainage has been light yellow.  Patient states mild tightness in her chest.  States she has had to use albuterol before and does believe that the smoke is causing some of her symptoms.  She states no fever, aches, or chills.  Patient declines Covid testing today citing concern for cost.         Objective     /82   Pulse 92   Temp 36.8 °C (98.3 °F) (Temporal)   Resp 16   Ht 1.753 m (5' 9\")   Wt 95.3 kg (210 lb)   SpO2 98%   BMI 31.01 kg/m²      Physical Exam  Vitals reviewed.   Constitutional:       Appearance: Normal appearance.   HENT:      Right Ear: Tympanic membrane, ear canal and external ear normal.      Left Ear: Tympanic membrane, ear canal and external ear normal.      Nose: Congestion present.      Comments: No tenderness over the frontal or maxillary sinuses.  Scant postnasal drainage.     Mouth/Throat:      Mouth: Mucous membranes are moist.   Eyes:      Extraocular Movements: Extraocular movements intact.      Conjunctiva/sclera: Conjunctivae normal.      Pupils: Pupils are equal, round, and reactive to light.   Cardiovascular:      Rate and Rhythm: Normal rate and regular rhythm.      Heart sounds: Normal heart sounds.   Pulmonary:      Effort: Pulmonary effort is normal. No respiratory distress.      Breath sounds: Normal breath sounds. No " stridor. No wheezing, rhonchi or rales.      Comments: Few scattered wheezes noted.  Chest:      Chest wall: No tenderness.   Musculoskeletal:         General: Normal range of motion.      Cervical back: Normal range of motion and neck supple.   Skin:     General: Skin is warm.   Neurological:      Mental Status: She is alert and oriented to person, place, and time.   Psychiatric:         Mood and Affect: Mood normal.         Behavior: Behavior normal.                             Assessment & Plan   Bronchitis  Upper respiratory infection    Patient declined Covid testing, see note above  Augmentin; start for increasing purulent sinus drainage or pain  Refilled albuterol inhaler  Follow-up in urgent care otherwise for any further questions or concerns     There are no diagnoses linked to this encounter.

## 2021-12-29 ENCOUNTER — APPOINTMENT (OUTPATIENT)
Dept: MEDICAL GROUP | Facility: PHYSICIAN GROUP | Age: 36
End: 2021-12-29

## 2022-01-03 ENCOUNTER — OFFICE VISIT (OUTPATIENT)
Dept: MEDICAL GROUP | Facility: PHYSICIAN GROUP | Age: 37
End: 2022-01-03
Payer: COMMERCIAL

## 2022-01-03 VITALS
OXYGEN SATURATION: 97 % | WEIGHT: 217 LBS | DIASTOLIC BLOOD PRESSURE: 74 MMHG | TEMPERATURE: 98.8 F | BODY MASS INDEX: 32.14 KG/M2 | HEIGHT: 69 IN | SYSTOLIC BLOOD PRESSURE: 110 MMHG | HEART RATE: 93 BPM

## 2022-01-03 DIAGNOSIS — J02.9 SORE THROAT: ICD-10-CM

## 2022-01-03 DIAGNOSIS — R53.83 OTHER FATIGUE: ICD-10-CM

## 2022-01-03 DIAGNOSIS — R10.10 UPPER ABDOMINAL PAIN: ICD-10-CM

## 2022-01-03 LAB
EXTERNAL QUALITY CONTROL: NORMAL
FLUAV+FLUBV AG SPEC QL IA: NORMAL
INT CON NEG: NEGATIVE
INT CON NEG: NEGATIVE
INT CON POS: POSITIVE
INT CON POS: POSITIVE
S PYO AG THROAT QL: NORMAL
SARS-COV+SARS-COV-2 AG RESP QL IA.RAPID: NEGATIVE

## 2022-01-03 PROCEDURE — 87880 STREP A ASSAY W/OPTIC: CPT | Performed by: FAMILY MEDICINE

## 2022-01-03 PROCEDURE — 99214 OFFICE O/P EST MOD 30 MIN: CPT | Performed by: FAMILY MEDICINE

## 2022-01-03 PROCEDURE — 87426 SARSCOV CORONAVIRUS AG IA: CPT | Performed by: FAMILY MEDICINE

## 2022-01-03 PROCEDURE — 87804 INFLUENZA ASSAY W/OPTIC: CPT | Performed by: FAMILY MEDICINE

## 2022-01-03 ASSESSMENT — PATIENT HEALTH QUESTIONNAIRE - PHQ9: CLINICAL INTERPRETATION OF PHQ2 SCORE: 0

## 2022-01-03 ASSESSMENT — FIBROSIS 4 INDEX: FIB4 SCORE: 0.76

## 2022-01-03 NOTE — PROGRESS NOTES
"CC: Abdominal/lower rib pain    HISTORY OF THE PRESENT ILLNESS: Patient is a 36 y.o. female.     Patient is here today with primary concern for pain or \"bruised sensation\" in her upper abdomen/lower ribs.  States it is on and off for at least a year now.  Describes the area as feeling bruised.  She went to a chiropractor but they felt it may not be musculoskeletal in nature.  She did have her liver enzymes checked in May 2021, and at that time they were normal.  She was having symptoms at that time as well.    Separately, patient notes for about 5 days she has had sore throat and fatigue.  Daughter was just recently diagnosed with croup.  Unknown if she has any sick contacts.    Allergies: Patient has no known allergies.    Current Outpatient Medications Ordered in Epic   Medication Sig Dispense Refill   • albuterol 108 (90 Base) MCG/ACT Aero Soln inhalation aerosol Inhale 2 Puffs every 6 hours as needed for Shortness of Breath. 8.5 g 1     No current [facility]-ordered facility-administered medications on file.       Past Medical History:   Diagnosis Date   • Functional ovarian cysts        History reviewed. No pertinent surgical history.    Social History     Tobacco Use   • Smoking status: Former Smoker   • Smokeless tobacco: Never Used   Vaping Use   • Vaping Use: Never used   Substance Use Topics   • Alcohol use: Yes   • Drug use: Not Currently     Types: Marijuana     Comment: passive       Social History     Social History Narrative    ** Merged History Encounter **            Family History   Problem Relation Age of Onset   • No Known Problems Mother    • Diabetes Father         type II   • Depression Father    • Cancer Maternal Grandmother         colon cancer   • Cancer Maternal Grandfather         colon cancer   • Lung Disease Father          Labs: Point-of-care testing for Covid, strep and flu were all negative.      Exam: /74 (BP Location: Right arm, Patient Position: Sitting, BP Cuff Size: Large adult) " "  Pulse 93   Temp 37.1 °C (98.8 °F) (Temporal)   Ht 1.753 m (5' 9\")   Wt 98.4 kg (217 lb)   SpO2 97%  Body mass index is 32.05 kg/m².    General: Well appearing, NAD  Pulmonary: Clear to ausculation.  Normal effort. No rales, ronchi, or wheezing.  Cardiovascular: Regular rate and rhythm without murmur, rubs or gallop.   Abdomen: Soft, nontender, nondistended. Normal bowel sounds. Liver and spleen are not palpable. No rebound or guarding  MSK: No significant rib tenderness in the area of concern.    Please note that this dictation was created using voice recognition software. I have made every reasonable attempt to correct obvious errors, but I expect that there are errors of grammar and possibly content that I did not discover before finalizing the note.      Assessment/Plan  Joycelyn was seen today for gi problem, fatigue and pharyngitis.    Diagnoses and all orders for this visit:    Upper abdominal pain  As above, patient having upper abdominal/lower rib pain.  She describes pain as bruising sensation and worse around her xiphoid area.  Certainly be musculoskeletal.  Labs reviewed and liver enzymes were normal in May 2021 at which point she was having symptoms as well.  We will go ahead and order abdominal ultrasound to rule out gallstones.  Could also consider gastritis.  Could consider trialing antacids and bland diet the next time symptoms flareup.  Certainly if bland diet does not help and abdominal survey is negative, may consider referral to physical therapy for musculoskeletal cause.  -     US-ABDOMEN COMPLETE SURVEY; Future    Sore throat  Other fatigue  Point-of-care testing negative in clinic today for strep, Covid and flu.  As daughter was recently diagnosed with croup, she likely has parainfluenza virus which will self resolve.  Supportive care recommended.  -     POCT Rapid Strep A  -     POCT SARS-COV Antigen ARA (Symptomatic Only)  -     POCT Influenza A/B      Follow-up in 6 to 12 months for " routine care or sooner if symptoms not improving.    Nancy Dillard DO  [facility]

## 2022-01-05 ENCOUNTER — HOSPITAL ENCOUNTER (OUTPATIENT)
Dept: RADIOLOGY | Facility: MEDICAL CENTER | Age: 37
End: 2022-01-05
Attending: FAMILY MEDICINE
Payer: COMMERCIAL

## 2022-01-05 DIAGNOSIS — R10.10 UPPER ABDOMINAL PAIN: ICD-10-CM

## 2022-01-05 PROCEDURE — 76700 US EXAM ABDOM COMPLETE: CPT

## 2022-08-03 ENCOUNTER — HOSPITAL ENCOUNTER (OUTPATIENT)
Facility: MEDICAL CENTER | Age: 37
End: 2022-08-03
Attending: STUDENT IN AN ORGANIZED HEALTH CARE EDUCATION/TRAINING PROGRAM | Admitting: STUDENT IN AN ORGANIZED HEALTH CARE EDUCATION/TRAINING PROGRAM
Payer: COMMERCIAL

## 2022-08-03 ENCOUNTER — HOSPITAL ENCOUNTER (OUTPATIENT)
Facility: MEDICAL CENTER | Age: 37
End: 2022-08-05
Attending: EMERGENCY MEDICINE | Admitting: FAMILY MEDICINE
Payer: COMMERCIAL

## 2022-08-03 ENCOUNTER — APPOINTMENT (OUTPATIENT)
Dept: RADIOLOGY | Facility: MEDICAL CENTER | Age: 37
End: 2022-08-03
Attending: STUDENT IN AN ORGANIZED HEALTH CARE EDUCATION/TRAINING PROGRAM
Payer: COMMERCIAL

## 2022-08-03 VITALS
RESPIRATION RATE: 18 BRPM | OXYGEN SATURATION: 98 % | HEIGHT: 68 IN | DIASTOLIC BLOOD PRESSURE: 86 MMHG | TEMPERATURE: 98 F | WEIGHT: 208.11 LBS | HEART RATE: 94 BPM | BODY MASS INDEX: 31.54 KG/M2 | SYSTOLIC BLOOD PRESSURE: 123 MMHG

## 2022-08-03 DIAGNOSIS — M65.9 TENOSYNOVITIS OF FINGER: ICD-10-CM

## 2022-08-03 DIAGNOSIS — L03.011 CELLULITIS OF FINGER OF RIGHT HAND: ICD-10-CM

## 2022-08-03 DIAGNOSIS — S61.259A DOG BITE OF FINGER, INITIAL ENCOUNTER: ICD-10-CM
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
## 2022-08-03 DIAGNOSIS — S61.451D DOG BITE OF RIGHT HAND, SUBSEQUENT ENCOUNTER: ICD-10-CM
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
## 2022-08-03 DIAGNOSIS — L08.9 FINGER INFECTION: ICD-10-CM

## 2022-08-03 PROBLEM — E66.9 OBESITY (BMI 30.0-34.9): Status: ACTIVE | Noted: 2022-08-03

## 2022-08-03 PROBLEM — L03.90 CELLULITIS: Status: ACTIVE | Noted: 2022-08-03

## 2022-08-03 PROBLEM — T14.8XXA WOUND INFECTION: Status: ACTIVE | Noted: 2022-08-03

## 2022-08-03 PROBLEM — E66.811 OBESITY (BMI 30.0-34.9): Status: ACTIVE | Noted: 2022-08-03

## 2022-08-03 LAB
ALBUMIN SERPL BCP-MCNC: 4.3 G/DL (ref 3.2–4.9)
ALBUMIN/GLOB SERPL: 1.8 G/DL
ALP SERPL-CCNC: 47 U/L (ref 30–99)
ALT SERPL-CCNC: 7 U/L (ref 2–50)
ANION GAP SERPL CALC-SCNC: 12 MMOL/L (ref 7–16)
AST SERPL-CCNC: 12 U/L (ref 12–45)
BASOPHILS # BLD AUTO: 0.3 % (ref 0–1.8)
BASOPHILS # BLD: 0.03 K/UL (ref 0–0.12)
BILIRUB SERPL-MCNC: 0.4 MG/DL (ref 0.1–1.5)
BUN SERPL-MCNC: 11 MG/DL (ref 8–22)
CALCIUM SERPL-MCNC: 9.1 MG/DL (ref 8.5–10.5)
CHLORIDE SERPL-SCNC: 104 MMOL/L (ref 96–112)
CO2 SERPL-SCNC: 22 MMOL/L (ref 20–33)
CREAT SERPL-MCNC: 0.67 MG/DL (ref 0.5–1.4)
CRP SERPL HS-MCNC: 1.37 MG/DL (ref 0–0.75)
EOSINOPHIL # BLD AUTO: 0.02 K/UL (ref 0–0.51)
EOSINOPHIL NFR BLD: 0.2 % (ref 0–6.9)
ERYTHROCYTE [DISTWIDTH] IN BLOOD BY AUTOMATED COUNT: 46.1 FL (ref 35.9–50)
ERYTHROCYTE [SEDIMENTATION RATE] IN BLOOD BY WESTERGREN METHOD: 6 MM/HOUR (ref 0–25)
GFR SERPLBLD CREATININE-BSD FMLA CKD-EPI: 115 ML/MIN/1.73 M 2
GLOBULIN SER CALC-MCNC: 2.4 G/DL (ref 1.9–3.5)
GLUCOSE SERPL-MCNC: 124 MG/DL (ref 65–99)
HCT VFR BLD AUTO: 40.3 % (ref 37–47)
HGB BLD-MCNC: 13.7 G/DL (ref 12–16)
IMM GRANULOCYTES # BLD AUTO: 0.03 K/UL (ref 0–0.11)
IMM GRANULOCYTES NFR BLD AUTO: 0.3 % (ref 0–0.9)
LYMPHOCYTES # BLD AUTO: 1.34 K/UL (ref 1–4.8)
LYMPHOCYTES NFR BLD: 15.6 % (ref 22–41)
MCH RBC QN AUTO: 32.9 PG (ref 27–33)
MCHC RBC AUTO-ENTMCNC: 34 G/DL (ref 33.6–35)
MCV RBC AUTO: 96.6 FL (ref 81.4–97.8)
MONOCYTES # BLD AUTO: 0.66 K/UL (ref 0–0.85)
MONOCYTES NFR BLD AUTO: 7.7 % (ref 0–13.4)
NEUTROPHILS # BLD AUTO: 6.52 K/UL (ref 2–7.15)
NEUTROPHILS NFR BLD: 75.9 % (ref 44–72)
NRBC # BLD AUTO: 0 K/UL
NRBC BLD-RTO: 0 /100 WBC
PLATELET # BLD AUTO: 153 K/UL (ref 164–446)
PMV BLD AUTO: 11.5 FL (ref 9–12.9)
POTASSIUM SERPL-SCNC: 3.7 MMOL/L (ref 3.6–5.5)
PROT SERPL-MCNC: 6.7 G/DL (ref 6–8.2)
RBC # BLD AUTO: 4.17 M/UL (ref 4.2–5.4)
SCCMEC + MECA PNL NOSE NAA+PROBE: NEGATIVE
SODIUM SERPL-SCNC: 138 MMOL/L (ref 135–145)
WBC # BLD AUTO: 8.6 K/UL (ref 4.8–10.8)

## 2022-08-03 PROCEDURE — 84703 CHORIONIC GONADOTROPIN ASSAY: CPT

## 2022-08-03 PROCEDURE — 99285 EMERGENCY DEPT VISIT HI MDM: CPT

## 2022-08-03 PROCEDURE — 36415 COLL VENOUS BLD VENIPUNCTURE: CPT

## 2022-08-03 PROCEDURE — 700111 HCHG RX REV CODE 636 W/ 250 OVERRIDE (IP): Performed by: EMERGENCY MEDICINE

## 2022-08-03 PROCEDURE — 700105 HCHG RX REV CODE 258: Performed by: STUDENT IN AN ORGANIZED HEALTH CARE EDUCATION/TRAINING PROGRAM

## 2022-08-03 PROCEDURE — 700102 HCHG RX REV CODE 250 W/ 637 OVERRIDE(OP): Performed by: HOSPITALIST

## 2022-08-03 PROCEDURE — 96375 TX/PRO/DX INJ NEW DRUG ADDON: CPT

## 2022-08-03 PROCEDURE — 700111 HCHG RX REV CODE 636 W/ 250 OVERRIDE (IP): Performed by: STUDENT IN AN ORGANIZED HEALTH CARE EDUCATION/TRAINING PROGRAM

## 2022-08-03 PROCEDURE — 700102 HCHG RX REV CODE 250 W/ 637 OVERRIDE(OP): Performed by: STUDENT IN AN ORGANIZED HEALTH CARE EDUCATION/TRAINING PROGRAM

## 2022-08-03 PROCEDURE — 700105 HCHG RX REV CODE 258: Performed by: EMERGENCY MEDICINE

## 2022-08-03 PROCEDURE — 96365 THER/PROPH/DIAG IV INF INIT: CPT

## 2022-08-03 PROCEDURE — 99222 1ST HOSP IP/OBS MODERATE 55: CPT | Mod: 57 | Performed by: ORTHOPAEDIC SURGERY

## 2022-08-03 PROCEDURE — 80053 COMPREHEN METABOLIC PANEL: CPT

## 2022-08-03 PROCEDURE — 96367 TX/PROPH/DG ADDL SEQ IV INF: CPT

## 2022-08-03 PROCEDURE — 73130 X-RAY EXAM OF HAND: CPT | Mod: RT

## 2022-08-03 PROCEDURE — 85652 RBC SED RATE AUTOMATED: CPT

## 2022-08-03 PROCEDURE — 86140 C-REACTIVE PROTEIN: CPT

## 2022-08-03 PROCEDURE — A9270 NON-COVERED ITEM OR SERVICE: HCPCS | Performed by: STUDENT IN AN ORGANIZED HEALTH CARE EDUCATION/TRAINING PROGRAM

## 2022-08-03 PROCEDURE — 85025 COMPLETE CBC W/AUTO DIFF WBC: CPT

## 2022-08-03 PROCEDURE — A9270 NON-COVERED ITEM OR SERVICE: HCPCS | Performed by: HOSPITALIST

## 2022-08-03 PROCEDURE — G0378 HOSPITAL OBSERVATION PER HR: HCPCS

## 2022-08-03 PROCEDURE — 99236 HOSP IP/OBS SAME DATE HI 85: CPT | Performed by: STUDENT IN AN ORGANIZED HEALTH CARE EDUCATION/TRAINING PROGRAM

## 2022-08-03 PROCEDURE — 99219 PR INITIAL OBSERVATION CARE,LEVL II: CPT | Mod: GC | Performed by: FAMILY MEDICINE

## 2022-08-03 PROCEDURE — 87641 MR-STAPH DNA AMP PROBE: CPT

## 2022-08-03 RX ORDER — AMOXICILLIN 250 MG
2 CAPSULE ORAL 2 TIMES DAILY
Status: DISCONTINUED | OUTPATIENT
Start: 2022-08-03 | End: 2022-08-03 | Stop reason: HOSPADM

## 2022-08-03 RX ORDER — OXYCODONE HYDROCHLORIDE AND ACETAMINOPHEN 5; 325 MG/1; MG/1
1-2 TABLET ORAL EVERY 6 HOURS PRN
Status: DISCONTINUED | OUTPATIENT
Start: 2022-08-03 | End: 2022-08-03 | Stop reason: HOSPADM

## 2022-08-03 RX ORDER — ONDANSETRON 2 MG/ML
4 INJECTION INTRAMUSCULAR; INTRAVENOUS EVERY 4 HOURS PRN
Status: DISCONTINUED | OUTPATIENT
Start: 2022-08-03 | End: 2022-08-03 | Stop reason: HOSPADM

## 2022-08-03 RX ORDER — M-VIT,TX,IRON,MINS/CALC/FOLIC 27MG-0.4MG
1 TABLET ORAL DAILY
Status: DISCONTINUED | OUTPATIENT
Start: 2022-08-03 | End: 2022-08-03 | Stop reason: HOSPADM

## 2022-08-03 RX ORDER — ONDANSETRON 4 MG/1
4 TABLET, ORALLY DISINTEGRATING ORAL EVERY 4 HOURS PRN
Status: DISCONTINUED | OUTPATIENT
Start: 2022-08-03 | End: 2022-08-03 | Stop reason: HOSPADM

## 2022-08-03 RX ORDER — DOXYCYCLINE 100 MG/1
100 TABLET ORAL EVERY 12 HOURS
Status: DISCONTINUED | OUTPATIENT
Start: 2022-08-03 | End: 2022-08-03 | Stop reason: HOSPADM

## 2022-08-03 RX ORDER — POLYETHYLENE GLYCOL 3350 17 G/17G
1 POWDER, FOR SOLUTION ORAL
Status: DISCONTINUED | OUTPATIENT
Start: 2022-08-03 | End: 2022-08-03 | Stop reason: HOSPADM

## 2022-08-03 RX ORDER — DOXYCYCLINE 100 MG/1
100 TABLET ORAL EVERY 12 HOURS
Qty: 14 TABLET | Refills: 0 | Status: SHIPPED | OUTPATIENT
Start: 2022-08-03 | End: 2022-08-03

## 2022-08-03 RX ORDER — PROCHLORPERAZINE EDISYLATE 5 MG/ML
5-10 INJECTION INTRAMUSCULAR; INTRAVENOUS EVERY 4 HOURS PRN
Status: DISCONTINUED | OUTPATIENT
Start: 2022-08-03 | End: 2022-08-03 | Stop reason: HOSPADM

## 2022-08-03 RX ORDER — M-VIT,TX,IRON,MINS/CALC/FOLIC 27MG-0.4MG
1 TABLET ORAL DAILY
Status: SHIPPED | COMMUNITY
End: 2023-01-03

## 2022-08-03 RX ORDER — AMOXICILLIN AND CLAVULANATE POTASSIUM 875; 125 MG/1; MG/1
1 TABLET, FILM COATED ORAL EVERY 12 HOURS
Status: DISCONTINUED | OUTPATIENT
Start: 2022-08-03 | End: 2022-08-03

## 2022-08-03 RX ORDER — AMOXICILLIN 250 MG
2 CAPSULE ORAL 2 TIMES DAILY
Status: DISCONTINUED | OUTPATIENT
Start: 2022-08-03 | End: 2022-08-05 | Stop reason: HOSPADM

## 2022-08-03 RX ORDER — POLYETHYLENE GLYCOL 3350 17 G/17G
1 POWDER, FOR SOLUTION ORAL
Status: DISCONTINUED | OUTPATIENT
Start: 2022-08-03 | End: 2022-08-05 | Stop reason: HOSPADM

## 2022-08-03 RX ORDER — BISACODYL 10 MG
10 SUPPOSITORY, RECTAL RECTAL
Status: DISCONTINUED | OUTPATIENT
Start: 2022-08-03 | End: 2022-08-05 | Stop reason: HOSPADM

## 2022-08-03 RX ORDER — LABETALOL HYDROCHLORIDE 5 MG/ML
10 INJECTION, SOLUTION INTRAVENOUS EVERY 4 HOURS PRN
Status: DISCONTINUED | OUTPATIENT
Start: 2022-08-03 | End: 2022-08-03 | Stop reason: HOSPADM

## 2022-08-03 RX ORDER — AMOXICILLIN AND CLAVULANATE POTASSIUM 875; 125 MG/1; MG/1
1 TABLET, FILM COATED ORAL EVERY 12 HOURS
Qty: 14 TABLET | Refills: 0 | Status: SHIPPED | OUTPATIENT
Start: 2022-08-03 | End: 2022-08-03

## 2022-08-03 RX ORDER — PROMETHAZINE HYDROCHLORIDE 25 MG/1
12.5-25 TABLET ORAL EVERY 4 HOURS PRN
Status: DISCONTINUED | OUTPATIENT
Start: 2022-08-03 | End: 2022-08-03 | Stop reason: HOSPADM

## 2022-08-03 RX ORDER — ACETAMINOPHEN 325 MG/1
650 TABLET ORAL EVERY 6 HOURS PRN
Status: DISCONTINUED | OUTPATIENT
Start: 2022-08-03 | End: 2022-08-03 | Stop reason: HOSPADM

## 2022-08-03 RX ORDER — PROMETHAZINE HYDROCHLORIDE 25 MG/1
12.5-25 SUPPOSITORY RECTAL EVERY 4 HOURS PRN
Status: DISCONTINUED | OUTPATIENT
Start: 2022-08-03 | End: 2022-08-03 | Stop reason: HOSPADM

## 2022-08-03 RX ORDER — SODIUM CHLORIDE 9 MG/ML
INJECTION, SOLUTION INTRAVENOUS CONTINUOUS
Status: DISCONTINUED | OUTPATIENT
Start: 2022-08-03 | End: 2022-08-03 | Stop reason: HOSPADM

## 2022-08-03 RX ORDER — ACETAMINOPHEN 325 MG/1
650 TABLET ORAL EVERY 6 HOURS PRN
Status: DISCONTINUED | OUTPATIENT
Start: 2022-08-03 | End: 2022-08-05 | Stop reason: HOSPADM

## 2022-08-03 RX ORDER — BISACODYL 10 MG
10 SUPPOSITORY, RECTAL RECTAL
Status: DISCONTINUED | OUTPATIENT
Start: 2022-08-03 | End: 2022-08-03 | Stop reason: HOSPADM

## 2022-08-03 RX ORDER — OXYCODONE HYDROCHLORIDE AND ACETAMINOPHEN 5; 325 MG/1; MG/1
1 TABLET ORAL ONCE
Status: COMPLETED | OUTPATIENT
Start: 2022-08-03 | End: 2022-08-03

## 2022-08-03 RX ORDER — KETOROLAC TROMETHAMINE 30 MG/ML
15 INJECTION, SOLUTION INTRAMUSCULAR; INTRAVENOUS ONCE
Status: COMPLETED | OUTPATIENT
Start: 2022-08-03 | End: 2022-08-03

## 2022-08-03 RX ORDER — KETOROLAC TROMETHAMINE 30 MG/ML
15 INJECTION, SOLUTION INTRAMUSCULAR; INTRAVENOUS EVERY 6 HOURS PRN
Status: DISCONTINUED | OUTPATIENT
Start: 2022-08-04 | End: 2022-08-05 | Stop reason: HOSPADM

## 2022-08-03 RX ORDER — AMOXICILLIN AND CLAVULANATE POTASSIUM 875; 125 MG/1; MG/1
1 TABLET, FILM COATED ORAL EVERY 12 HOURS
Status: DISCONTINUED | OUTPATIENT
Start: 2022-08-03 | End: 2022-08-03 | Stop reason: HOSPADM

## 2022-08-03 RX ADMIN — SODIUM CHLORIDE 3 G: 900 INJECTION INTRAVENOUS at 06:49

## 2022-08-03 RX ADMIN — MULTIPLE VITAMINS W/ MINERALS TAB 1 TABLET: TAB at 06:50

## 2022-08-03 RX ADMIN — SODIUM CHLORIDE 3 G: 900 INJECTION INTRAVENOUS at 19:12

## 2022-08-03 RX ADMIN — SODIUM CHLORIDE 3 G: 900 INJECTION INTRAVENOUS at 23:32

## 2022-08-03 RX ADMIN — OXYCODONE HYDROCHLORIDE AND ACETAMINOPHEN 1 TABLET: 5; 325 TABLET ORAL at 02:06

## 2022-08-03 RX ADMIN — KETOROLAC TROMETHAMINE 15 MG: 30 INJECTION, SOLUTION INTRAMUSCULAR at 19:12

## 2022-08-03 RX ADMIN — AMOXICILLIN AND CLAVULANATE POTASSIUM 1 TABLET: 875; 125 TABLET, FILM COATED ORAL at 11:54

## 2022-08-03 RX ADMIN — OXYCODONE AND ACETAMINOPHEN 1 TABLET: 5; 325 TABLET ORAL at 06:56

## 2022-08-03 RX ADMIN — OXYCODONE AND ACETAMINOPHEN 1 TABLET: 5; 325 TABLET ORAL at 11:54

## 2022-08-03 RX ADMIN — DOXYCYCLINE 100 MG: 100 TABLET, FILM COATED ORAL at 10:42

## 2022-08-03 RX ADMIN — SODIUM CHLORIDE: 9 INJECTION, SOLUTION INTRAVENOUS at 04:58

## 2022-08-03 RX ADMIN — PIPERACILLIN AND TAZOBACTAM 3.38 G: 3; .375 INJECTION, POWDER, LYOPHILIZED, FOR SOLUTION INTRAVENOUS; PARENTERAL at 02:07

## 2022-08-03 ASSESSMENT — ENCOUNTER SYMPTOMS
DIARRHEA: 0
EYE PAIN: 0
SINUS PAIN: 0
MEMORY LOSS: 0
SENSORY CHANGE: 0
COUGH: 0
BLURRED VISION: 0
FOCAL WEAKNESS: 0
SHORTNESS OF BREATH: 0
ABDOMINAL PAIN: 0
FALLS: 0
NAUSEA: 0
CHILLS: 0
VOMITING: 0
PALPITATIONS: 0
SPEECH CHANGE: 0
FEVER: 0
SPUTUM PRODUCTION: 0

## 2022-08-03 ASSESSMENT — PATIENT HEALTH QUESTIONNAIRE - PHQ9
SUM OF ALL RESPONSES TO PHQ9 QUESTIONS 1 AND 2: 0
1. LITTLE INTEREST OR PLEASURE IN DOING THINGS: NOT AT ALL
2. FEELING DOWN, DEPRESSED, IRRITABLE, OR HOPELESS: NOT AT ALL

## 2022-08-03 ASSESSMENT — LIFESTYLE VARIABLES
TOTAL SCORE: 0
AVERAGE NUMBER OF DAYS PER WEEK YOU HAVE A DRINK CONTAINING ALCOHOL: 0
SUBSTANCE_ABUSE: 0
ALCOHOL_USE: NO
EVER FELT BAD OR GUILTY ABOUT YOUR DRINKING: NO
HAVE PEOPLE ANNOYED YOU BY CRITICIZING YOUR DRINKING: NO
HAVE YOU EVER FELT YOU SHOULD CUT DOWN ON YOUR DRINKING: NO
TOTAL SCORE: 0
DOES PATIENT WANT TO STOP DRINKING: NO
EVER HAD A DRINK FIRST THING IN THE MORNING TO STEADY YOUR NERVES TO GET RID OF A HANGOVER: NO
TOTAL SCORE: 0
CONSUMPTION TOTAL: NEGATIVE
ON A TYPICAL DAY WHEN YOU DRINK ALCOHOL HOW MANY DRINKS DO YOU HAVE: 0
HOW MANY TIMES IN THE PAST YEAR HAVE YOU HAD 5 OR MORE DRINKS IN A DAY: 0

## 2022-08-03 ASSESSMENT — FIBROSIS 4 INDEX
FIB4 SCORE: 0.76
FIB4 SCORE: 1.1

## 2022-08-03 ASSESSMENT — PAIN DESCRIPTION - PAIN TYPE
TYPE: ACUTE PAIN
TYPE: ACUTE PAIN

## 2022-08-03 NOTE — ASSESSMENT & PLAN NOTE
Patient endorses anxiety surrounding medical care, does not feel overly anxious at this time.  Not on any outpatient medications.

## 2022-08-03 NOTE — ED PROVIDER NOTES
"ED Provider Note    CHIEF COMPLAINT  Chief Complaint   Patient presents with   • Dog Bite     Monday morning at 1000. Friend's dog, fully vaccinated. Right index finger, +redness and swelling.        HPI  Joycelyn Awan is a 36 y.o. female who presents with increased swelling, pain, redness, and drainage from her right index finger.  A friend's dog who is fully vaccinated bit her Monday morning around 10 AM.  Patient was not evaluated because lesions were small and she did not think they needed stitches.  Since that time she has been having increasing swelling and pain.  She denies fevers, chills, nausea, vomiting.  She is right-handed.  States she has had increased difficulty bending the finger especially over the course of the last day.  She states her last tetanus shot was 4 years ago.  She denies any chronic medical problems.    REVIEW OF SYSTEMS  See HPI for further details. All other systems are negative.     PAST MEDICAL HISTORY   has a past medical history of Functional ovarian cysts.    SOCIAL HISTORY  Social History     Tobacco Use   • Smoking status: Former Smoker   • Smokeless tobacco: Never Used   Vaping Use   • Vaping Use: Never used   Substance and Sexual Activity   • Alcohol use: Yes   • Drug use: Not Currently     Types: Marijuana     Comment: passive   • Sexual activity: Yes     Partners: Male     Birth control/protection: I.U.D., Pill       SURGICAL HISTORY  patient denies any surgical history    CURRENT MEDICATIONS  Home Medications     Reviewed by Mumtaz Buitrago (Pharmacy Tech) on 08/03/22 at 0210  Med List Status: Complete   Medication Last Dose Status   Ascorbic Acid (VITAMIN C PO) 8/2/2022 Active   therapeutic multivitamin-minerals (THERAGRAN-M) Tab 8/2/2022 Active                ALLERGIES  No Known Allergies    PHYSICAL EXAM  VITAL SIGNS: /78   Pulse 96   Temp 37.1 °C (98.8 °F) (Temporal)   Resp 20   Ht 1.727 m (5' 8\")   Wt 94.4 kg (208 lb 1.8 oz)   SpO2 99%   " BMI 31.64 kg/m²     Pulse ox interpretation: I interpret this pulse ox as normal.  Constitutional: Alert in no apparent distress.  HENT: No signs of trauma, Bilateral external ears normal, Nose normal.   Eyes: Pupils are equal and reactive, Conjunctiva normal, Non-icteric.   Neck: Normal range of motion, No tenderness, Supple, No stridor.   Cardiovascular: Regular rate and rhythm, no murmurs.   Thorax & Lungs: Normal breath sounds, No respiratory distress, No wheezing.   Abdomen: Soft, No tenderness, No masses, No pulsatile masses. No peritoneal signs.  Skin: Warm, Dry, No erythema, No rash.   Extremities: Intact distal pulses, right index finger with 2 puncture wounds, one overlying the PIP joint, no purulent drainage erythema, warmth, fusiform swelling, increased pain with passive extension, finger held in slight flexion, fusiform swelling, diffuse tenderness over flexor and extensor surface.  Swelling extending into hand.  Musculoskeletal: Good range of motion in all major joints. No major deformities noted.   Neurologic: Alert , Normal motor function, No focal deficits noted.   Psychiatric: Affect normal, Judgment normal, Mood normal.       DIAGNOSTIC STUDIES / PROCEDURES    LABS  Results for orders placed or performed during the hospital encounter of 08/03/22   CBC WITH DIFFERENTIAL   Result Value Ref Range    WBC 8.6 4.8 - 10.8 K/uL    RBC 4.17 (L) 4.20 - 5.40 M/uL    Hemoglobin 13.7 12.0 - 16.0 g/dL    Hematocrit 40.3 37.0 - 47.0 %    MCV 96.6 81.4 - 97.8 fL    MCH 32.9 27.0 - 33.0 pg    MCHC 34.0 33.6 - 35.0 g/dL    RDW 46.1 35.9 - 50.0 fL    Platelet Count 153 (L) 164 - 446 K/uL    MPV 11.5 9.0 - 12.9 fL    Neutrophils-Polys 75.90 (H) 44.00 - 72.00 %    Lymphocytes 15.60 (L) 22.00 - 41.00 %    Monocytes 7.70 0.00 - 13.40 %    Eosinophils 0.20 0.00 - 6.90 %    Basophils 0.30 0.00 - 1.80 %    Immature Granulocytes 0.30 0.00 - 0.90 %    Nucleated RBC 0.00 /100 WBC    Neutrophils (Absolute) 6.52 2.00 - 7.15  K/uL    Lymphs (Absolute) 1.34 1.00 - 4.80 K/uL    Monos (Absolute) 0.66 0.00 - 0.85 K/uL    Eos (Absolute) 0.02 0.00 - 0.51 K/uL    Baso (Absolute) 0.03 0.00 - 0.12 K/uL    Immature Granulocytes (abs) 0.03 0.00 - 0.11 K/uL    NRBC (Absolute) 0.00 K/uL   COMP METABOLIC PANEL   Result Value Ref Range    Sodium 138 135 - 145 mmol/L    Potassium 3.7 3.6 - 5.5 mmol/L    Chloride 104 96 - 112 mmol/L    Co2 22 20 - 33 mmol/L    Anion Gap 12.0 7.0 - 16.0    Glucose 124 (H) 65 - 99 mg/dL    Bun 11 8 - 22 mg/dL    Creatinine 0.67 0.50 - 1.40 mg/dL    Calcium 9.1 8.5 - 10.5 mg/dL    AST(SGOT) 12 12 - 45 U/L    ALT(SGPT) 7 2 - 50 U/L    Alkaline Phosphatase 47 30 - 99 U/L    Total Bilirubin 0.4 0.1 - 1.5 mg/dL    Albumin 4.3 3.2 - 4.9 g/dL    Total Protein 6.7 6.0 - 8.2 g/dL    Globulin 2.4 1.9 - 3.5 g/dL    A-G Ratio 1.8 g/dL   CRP QUANTITIVE (NON-CARDIAC)   Result Value Ref Range    Stat C-Reactive Protein 1.37 (H) 0.00 - 0.75 mg/dL   Sed Rate   Result Value Ref Range    Sed Rate Westergren 6 0 - 25 mm/hour   ESTIMATED GFR   Result Value Ref Range    GFR (CKD-EPI) 115 >60 mL/min/1.73 m 2         RADIOLOGY  DX-HAND 3+ RIGHT   Final Result      1.  No evidence of acute fracture or dislocation.   2.  Soft tissue swelling of the second digit.              COURSE & MEDICAL DECISION MAKING  Pertinent Labs & Imaging studies reviewed. (See chart for details)    2:56 AM  Spoke with merari Musa.  He says unlikely to need surgery, admit to medicine for IV antibiotics.  Hospitalist has been paged.      36-year-old female presenting with increased right finger pain and swelling following dog bite 2 days prior.  Patient with obvious cellulitis/infection of the finger which is extending down into her hand at this time.  Her finger is in slight flexion with fusiform swelling and significant pain with passive extension concerning for possible developing flexor tenosynovitis.  She does not have any obvious foreign bodies on x-ray, one  of the wounds does overlie the PIP joint, so cannot fully rule out entry into the joint space.  Her CRP is mildly elevated, however her white blood cell count is normal and she does not have any fevers at this time.  She was started on Unasyn to cover dog bite shalini.  Admitted to hospitalist.  Case was discussed with orthopedics and they felt it was unlikely to need operating room assuming she proves as expected but agreed with IV antibiotics and hospitalist admission.  No tetanus update indicated.    FINAL IMPRESSION  1. Dog bite of finger, initial encounter     2. Finger infection           Electronically signed by: Kimberley Huff M.D., 8/3/2022 1:21 AM

## 2022-08-03 NOTE — CONSULTS
Date of Service: 08/03/22    Joycelyn Awan was seen today in consultation for right hand dog bite wound at the request of Dr. Huff    CC: Right index finger bite wound    HPI: Joycelyn Awan is a 36 y.o. female who presents with complaints of pain to right index finger.  This started 2 days ago after she was bitten by her friends neville.  She says the dog was otherwise vaccinated and known to her.  She initially did not seek treatment but the pain was continuing to increase yesterday and she came into the emergency department last night at midnight.  The pain is 5/10 and is described as sharp.  The pain is made worse by palpation of the area and made better by rest and immobilization.    PMH:   Past Medical History:   Diagnosis Date   • Functional ovarian cysts        PSH: History reviewed. No pertinent surgical history.    FH:   Family History   Problem Relation Age of Onset   • No Known Problems Mother    • Diabetes Father         type II   • Depression Father    • Cancer Maternal Grandmother         colon cancer   • Cancer Maternal Grandfather         colon cancer   • Lung Disease Father        SH:   Social History     Socioeconomic History   • Marital status:      Spouse name: Not on file   • Number of children: Not on file   • Years of education: Not on file   • Highest education level: Not on file   Occupational History   • Not on file   Tobacco Use   • Smoking status: Former Smoker   • Smokeless tobacco: Never Used   Vaping Use   • Vaping Use: Never used   Substance and Sexual Activity   • Alcohol use: Yes   • Drug use: Not Currently     Types: Marijuana     Comment: passive   • Sexual activity: Yes     Partners: Male     Birth control/protection: I.U.D., Pill   Other Topics Concern   • Not on file   Social History Narrative    ** Merged History Encounter **          Social Determinants of Health     Financial Resource Strain: Not on file   Food Insecurity: Not on file  "  Transportation Needs: Not on file   Physical Activity: Not on file   Stress: Not on file   Social Connections: Not on file   Intimate Partner Violence: Not on file   Housing Stability: Not on file       ROS: In review of the following systems: Constitutional, Eyes, ENT, Cardiovascular,Respiratory, GI, , Musculoskeletal, Skin, Neuro, Psych, Hematologic, Endocrine, Allergic; no pertinent findings were found related to the chief complaint and orthopedic injury     /76   Pulse 87   Temp 37.1 °C (98.8 °F) (Temporal)   Resp 18   Ht 1.727 m (5' 8\")   Wt 94.4 kg (208 lb 1.8 oz)   SpO2 98%     Physical Exam:  General: Well nourished, well developed, age appropriate appearance   HEENT: Normocephalic, atraumatic  Psych: Normal mood and affect  Neck: Supple, no pain to motion  Chest/Pulmonary: breathing unlabored, no audible wheezing  Cardio: Regular heart rate and rhythm  Neuro: Sensation grossly intact to BUE and BLE, moving all four extremities  Skin: Intact with no full thickness abrasions or lacerations  MSK: Right hand shows bite wound to the index finger with ecchymoses and soft tissue swelling.  The bites are over the dorsum of the finger including 1 over the PIP joint.  The entire finger especially dorsally is tender as well as ecchymotic.  No tenderness in the palm to palpation over the flexor tendon pathway.    Imaging and labs: CRP 1.3 ESR 6    X-ray of the right hand shows no bony fractures just soft tissue swelling to the right index finger    Recent Labs     08/03/22  0132   WBC 8.6   RBC 4.17*   HEMOGLOBIN 13.7   HEMATOCRIT 40.3   MCV 96.6   MCH 32.9   RDW 46.1   PLATELETCT 153*   MPV 11.5   NEUTSPOLYS 75.90*   LYMPHOCYTES 15.60*   MONOCYTES 7.70   EOSINOPHILS 0.20   BASOPHILS 0.30       Assessment:   1. Dog bite of finger, initial encounter     2. Finger infection         I discussed the diagnosis and findings with the patient at length.  I reviewed possible non operative and operative " interventions and the risks and benefits of each of these.  Currently I recommend continue nonoperative management.  This appears to be a crush injury to the finger does not show any signs of active infection.  She had a chance to ask questions and all of these were answered to her satisfaction.        Plan:  Nonoperative management    Aggressive work on range of motion.  Injury is most consistent with a crush injury from a pitbull he does not show any signs of tenosynovitis or other operative indications.  She is at the maximal point of swelling and inflammation now 48 hours out from the injury.  This should start to improve over the next day to 2 days.

## 2022-08-03 NOTE — H&P
Hospital Medicine History & Physical Note    Date of Service  8/3/2022    Primary Care Physician  Nancy Dillard D.O.    Consultants  No formal consultation, ERP discussed with Dr. Corbin from orthopedics.    Code Status  Full Code    Chief Complaint  Chief Complaint   Patient presents with   • Dog Bite     Monday morning at 1000. Friend's dog, fully vaccinated. Right index finger, +redness and swelling.        History of Presenting Illness  Joycelyn Awan is a 36 y.o. female with history of anxiety and depression who presented 8/3/2022 with cellulitis of right index finger following dog bite on the day prior to presentation.  Patient was in her usual state of health prior to her receiving a dog bite at 10 AM on August 1 from her friend's dog who is fully vaccinated.  Patient relates that the bite caused two deep puncture wounds on her right index finger, no other injuries noted.  She has had progression of swelling of her right index finger moving into her right hand, pain (rated as 7 out of 10 on admission described as aching, throbbing) decreased range of motion due to swelling and pain, erythema since the dog bite.  She has had slight numbness of her affected finger with progression of the swelling but overall sensation remains intact. There has been no purulent drainage from any of her wounds. No blistering of her skin. She did not seek any medical attention and has taken no antibiotics.  She has had no associated fevers or chills, no nausea or vomiting, no signs or symptoms consistent with systemic infection.  Due to the progressive nature of her symptoms she presented for evaluation.  She otherwise denies any recent headaches, chest pain, abdominal pain, dysuria or diarrhea.      In the ED patient is nontoxic, afebrile, pulse in the 90s normal respiratory rate systolic blood pressure ranged from 1 35-1 55 she is on room air with normal oxygen saturation.  Work-up shows no leukocytosis,  slightly elevated glucose otherwise normal CHEM panel, CRP elevated 1.37, right hand x-ray shows no evidence of acute fracture or dislocation, does show soft tissue swelling of the second digit.  Erythema margins were marked in the ED. ERP discussed case with Dr. Corbin from orthopedics who felt patient was unlikely to need any immediate surgery and advised to admit for IV antibiotics.  She was started on Zosyn and ERP subsequently paged hospitalist, I discussed case with ERP at approximately 3 AM.  Patient has no chronic medical conditions and is not on any medications aside from multivitamins.    I discussed the plan of care with patient, bedside RN and pharmacy.    Review of Systems  Review of Systems   Constitutional: Negative for chills, fever and malaise/fatigue.   HENT: Negative for congestion and sinus pain.    Eyes: Negative for blurred vision and pain.   Respiratory: Negative for cough, sputum production and shortness of breath.    Cardiovascular: Negative for chest pain, palpitations and leg swelling.   Gastrointestinal: Negative for abdominal pain, diarrhea, nausea and vomiting.   Genitourinary: Negative for dysuria and hematuria.   Musculoskeletal: Positive for joint pain. Negative for falls.   Neurological: Negative for sensory change, speech change and focal weakness.   Psychiatric/Behavioral: Negative for memory loss and substance abuse.       Past Medical History   has a past medical history of Functional ovarian cysts.    Surgical History   has no past surgical history on file.     Family History  family history includes Cancer in her maternal grandfather and maternal grandmother; Depression in her father; Diabetes in her father; Lung Disease in her father; No Known Problems in her mother.       Social History   reports that she has quit smoking. She has never used smokeless tobacco. She reports current alcohol use. She reports previous drug use. Drug: Marijuana.    Allergies  No Known  Allergies    Medications  Prior to Admission Medications   Prescriptions Last Dose Informant Patient Reported? Taking?   Ascorbic Acid (VITAMIN C PO) 8/2/2022 at AM Patient Yes Yes   Sig: Take 1 Tablet by mouth every day.   therapeutic multivitamin-minerals (THERAGRAN-M) Tab 8/2/2022 at AM Patient Yes Yes   Sig: Take 1 Tablet by mouth every day.      Facility-Administered Medications: None       Physical Exam  Temp:  [37.1 °C (98.8 °F)] 37.1 °C (98.8 °F)  Pulse:  [90-96] 96  Resp:  [20] 20  BP: (135-155)/(78-94) 135/78  SpO2:  [98 %-99 %] 99 %  Blood Pressure: 135/78   Temperature: 37.1 °C (98.8 °F)   Pulse: 96   Respiration: 20   Pulse Oximetry: 99 %       Physical Exam  Vitals and nursing note reviewed.   Constitutional:       General: She is not in acute distress.     Appearance: She is not toxic-appearing.      Comments: 36-year-old female appears stated age alert and conversant able to speak full sentences without becoming breathless, no acute distress, nontoxic-appearing, pleasant mood   HENT:      Head: Normocephalic and atraumatic.      Nose: Nose normal. No rhinorrhea.      Mouth/Throat:      Mouth: Mucous membranes are dry.      Pharynx: Oropharynx is clear.   Eyes:      General: No scleral icterus.     Extraocular Movements: Extraocular movements intact.      Conjunctiva/sclera: Conjunctivae normal.      Pupils: Pupils are equal, round, and reactive to light.   Cardiovascular:      Rate and Rhythm: Normal rate and regular rhythm.      Pulses: Normal pulses.      Heart sounds: No murmur heard.  Pulmonary:      Effort: Pulmonary effort is normal. No respiratory distress.      Breath sounds: Normal breath sounds. No wheezing, rhonchi or rales.   Abdominal:      General: Bowel sounds are normal.      Palpations: Abdomen is soft.      Tenderness: There is no abdominal tenderness. There is no guarding or rebound.   Musculoskeletal:         General: Swelling and tenderness present.      Cervical back: Normal  range of motion and neck supple. No rigidity or tenderness.      Right lower leg: No edema.      Left lower leg: No edema.      Comments: Right index finger swelling, decreased range of motion, tenderness   Skin:     General: Skin is warm and dry.      Capillary Refill: Capillary refill takes 2 to 3 seconds. Capillary refill of right index finger slightly delayed 2 to 3 seconds, otherwise normal.     Findings: Erythema (of right index finger and extending into dorsum of right hand, margins marked prior to my evaluation and erythema does not extend beyond margins.) and lesion (two wounds of right index finger pictures below, no drainage, no purulence, no blistering) present.      Comments: Right third digit nail hematoma unrelated to recent injury, associated with injury a few weeks prior to presentation, nontender   Neurological:      General: No focal deficit present.      Mental Status: She is alert and oriented to person, place, and time. Mental status is at baseline.      Cranial Nerves: No cranial nerve deficit.      Sensory: No sensory deficit.      Motor: No weakness.      Coordination: Coordination normal.   Psychiatric:         Mood and Affect: Mood normal.         Behavior: Behavior normal.         Thought Content: Thought content normal.         Judgment: Judgment normal.           Laboratory:  Recent Labs     08/03/22  0132   WBC 8.6   RBC 4.17*   HEMOGLOBIN 13.7   HEMATOCRIT 40.3   MCV 96.6   MCH 32.9   MCHC 34.0   RDW 46.1   PLATELETCT 153*   MPV 11.5     Recent Labs     08/03/22  0132   SODIUM 138   POTASSIUM 3.7   CHLORIDE 104   CO2 22   GLUCOSE 124*   BUN 11   CREATININE 0.67   CALCIUM 9.1     Recent Labs     08/03/22  0132   ALTSGPT 7   ASTSGOT 12   ALKPHOSPHAT 47   TBILIRUBIN 0.4   GLUCOSE 124*         No results for input(s): NTPROBNP in the last 72 hours.      No results for input(s): TROPONINT in the last 72 hours.    Imaging:  DX-HAND 3+ RIGHT   Final Result      1.  No evidence of acute  fracture or dislocation.   2.  Soft tissue swelling of the second digit.          no chest X-Ray or EKG requiring interpretation.  Right hand x-ray reveals soft tissue swelling of second digit, no evidence of fracture or dislocation.      Assessment/Plan:  Justification for Admission Status  I anticipate this patient is appropriate for observation status at this time because Admitted for cellulitis secondary to dog bite, patient not septic.      * Cellulitis- (present on admission)  Assessment & Plan  Nontoxic.  Secondary to dog bite, received Zosyn in the ED, will continue with Unasyn for now.  Patient with significant swelling impacting range of motion, no blistering or purulent drainage, no subcutaneous gas appreciated, last tetanus shot 4 years ago.  Watch clinical course closely, erythema not extending beyond margins and no purulent drainage, if swelling/range of motion does not improve should consider CT with contrast to further evaluate.    Obesity (BMI 30.0-34.9)- (present on admission)  Assessment & Plan  Recommendation for follow-up outpatient PCP for lifestyle modification including diet and exercise regiment of at least 30 min of brisk cardiovascular exercise 3-5 times weekly.    Anxiety- (present on admission)  Assessment & Plan  Patient endorses anxiety surrounding medical care, does not feel overly anxious at this time.  Not on any outpatient medications.      VTE prophylaxis: SCDs/TEDs and Xarelto 10 mg daily as prophylaxis

## 2022-08-03 NOTE — ASSESSMENT & PLAN NOTE
Nontoxic.  Secondary to dog bite, received Zosyn in the ED, will continue with Unasyn for now.  Patient with significant swelling impacting range of motion, no blistering or purulent drainage, no subcutaneous gas appreciated, last tetanus shot 4 years ago.  Watch clinical course closely, erythema not extending beyond margins and no purulent drainage, if swelling/range of motion does not improve should consider CT with contrast to further evaluate.

## 2022-08-03 NOTE — ED NOTES
Pt w/ two small puncture wounds on first digit of R hand. Obvious swelling and slight discoloration noted. Pt ambulatory to rm w/o assistance, connected to VS, resting comfortably NAD, equal chest rise/fall. Chart up for ERP.

## 2022-08-03 NOTE — ASSESSMENT & PLAN NOTE
Recommendation for follow-up outpatient PCP for lifestyle modification including diet and exercise regiment of at least 30 min of brisk cardiovascular exercise 3-5 times weekly.

## 2022-08-03 NOTE — ED NOTES
DOS 8/30/2018  Hubert Muñoz is seen today for chief complaint   Chief Complaint   Patient presents with   • Follow-up   • Shoulder Pain     right     History of Present Illness:   Hubert reports that has much less pain but still if do certain movement in his shoulder feels a burning pain in his upper forearm and has finished his 3 weeks of NSAIDs     Past Medical History:   Diagnosis Date   • Negative History of CA, HTN, DM, CAD, CVA, DVT, Asthma 8/16/10     Past Surgical History:   Procedure Laterality Date   • Anterior cruciate ligament repair  2010    Right   • Hand/finger surgery unlisted  1990    Unspecified L Hand procedure   • Oral surgery procedure  age 17    [facility] Teeth Extraction   • Removal of sperm duct(s)  12/2009    Vasectomy     Current Outpatient Prescriptions   Medication Sig Dispense Refill   • diclofenac (VOLTAREN) 50 MG EC tablet Take 1 tablet by mouth 2 times daily. 40 tablet 0     No current facility-administered medications for this visit.      ALLERGIES:  No Known Allergies    Review of Systems:    Constitutional:  No weight change or fever.  Cardiovascular:  No chest pain or palpitations.  No swelling in legs.   Respiratory:  No shortness of breath or cough, no chest pain with breathing.   Gastrointestinal:  No abdominal pain, no change in bowel habits, no nausea or vomiting, no blood in stool.    Musculoskeletal:  Right shoulder minimal pain but burning in forearm as per HPI   Integumentary (skin and/or breasts):   Skin:  No rash or itching.   Neurologic:  No focal weakness or headache or dizziness, no numbness.      Physical Examination:  Visit Vitals  /74   Pulse 56   Wt 79.7 kg   SpO2 99%   BMI 24.52 kg/m²       Wt Readings from Last 1 Encounters:   08/30/18 79.7 kg      Non tender on right shoulder and has full ROM in right shoulder, strength is normal in right arm as well no bruise present     Extremities have no edema and no tenderness and patient is moving all 4 extremities  Gave patient a hot breakfast tray   independently and without any difficulty.  Skin is intact without rash or ulceration.    Assessment and Plan:  Hubert was seen today for follow-up and shoulder pain.    Diagnoses and all orders for this visit:    Acute pain of right shoulder  -     SERVICE TO ORTHOPEDICS    Sprain of right rotator cuff capsule, subsequent encounter  -     SERVICE TO ORTHOPEDICS      Curtis Gentile MD  8/30/2018  12:56 PM

## 2022-08-03 NOTE — DISCHARGE SUMMARY
Discharge Summary    CHIEF COMPLAINT ON ADMISSION  Chief Complaint   Patient presents with   • Dog Bite     Monday morning at 1000. Friend's dog, fully vaccinated. Right index finger, +redness and swelling.        Reason for Admission  Dog Bite     Admission Date  8/3/2022    CODE STATUS  Full Code    HPI & HOSPITAL COURSE  This is a 36 y.o. female here with history of anxiety and depression who comes into the emergency room after a dog bite and swelling around right index finger.  Dog was her friends who is fully vaccinated.  It associated with the recommended changes, swelling, pain and purulent discharge. the ED patient is nontoxic, afebrile, pulse in the 90s normal respiratory rate systolic blood pressure ranged from 1 35-1 55 she is on room air with normal oxygen saturation.  Work-up shows no leukocytosis, slightly elevated glucose otherwise normal CHEM panel, CRP elevated 1.37, right hand x-ray shows no evidence of acute fracture or dislocation, does show soft tissue swelling of the second digit.  Erythema margins were marked in the ED. ERP discussed case with Dr. Corbin from orthopedics who felt patient was unlikely to need any immediate surgery and advised to admit for IV antibiotics.  Patient was started on IV Unasyn.  She was discharged with oral Augmentin and doxycycline.  She was asked to follow-up with orthopedics within 1 week.      Therefore, she is discharged in fair and stable condition to home with close outpatient follow-up.        Discharge Date  8/3/2022    FOLLOW UP ITEMS POST DISCHARGE  Follow up with orthopedics     DISCHARGE DIAGNOSES  Principal Problem:    Cellulitis POA: Yes  Active Problems:    Anxiety POA: Yes    Obesity (BMI 30.0-34.9) POA: Yes  Resolved Problems:    * No resolved hospital problems. *      FOLLOW UP  Future Appointments   Date Time Provider Department Center   8/4/2022  1:45 PM Alessandra Ramey M.D. [facility]     Luciano Corbin M.D.  [address]  [place] [address]  [phone]    Follow up in 1 week(s)        MEDICATIONS ON DISCHARGE     Medication List      CONTINUE taking these medications      Instructions   therapeutic multivitamin-minerals Tabs   Take 1 Tablet by mouth every day.  Dose: 1 Tablet     VITAMIN C PO   Take 1 Tablet by mouth every day.  Dose: 1 Tablet            Allergies  No Known Allergies    DIET  No orders of the defined types were placed in this encounter.      ACTIVITY  As tolerated.  Weight bearing as tolerated    CONSULTATIONS   orthopedics     PROCEDURES  None    LABORATORY  Lab Results   Component Value Date    SODIUM 138 08/03/2022    POTASSIUM 3.7 08/03/2022    CHLORIDE 104 08/03/2022    CO2 22 08/03/2022    GLUCOSE 124 (H) 08/03/2022    BUN 11 08/03/2022    CREATININE 0.67 08/03/2022        Lab Results   Component Value Date    WBC 7.1 08/04/2022    HEMOGLOBIN 12.6 08/04/2022    HEMATOCRIT 37.9 08/04/2022    PLATELETCT 152 (L) 08/04/2022        Total time of the discharge process exceeds 50 minutes.

## 2022-08-03 NOTE — ED NOTES
Patient medicated per MAR.  Pt discharged to home. Pt was given follow up instructions and prescriptions for augmentin and doxycycline. Pt verbalized understanding of all instructions for discharge and is ambulatory out of ED with steady gait. AOx4

## 2022-08-03 NOTE — ED TRIAGE NOTES
Joycelyn Awan  36 y.o. female  Chief Complaint   Patient presents with   • Dog Bite     Monday morning at 1000. Friend's dog, fully vaccinated. Right index finger, +redness and swelling.        Vitals:    08/03/22 0021   BP: (!) 155/94   Pulse: 90   Resp: 20   Temp: 37.1 °C (98.8 °F)   SpO2: 98%       Triage process explained to patient, apologized for wait time, and returned to lobby.  Pt informed to notify staff of any change in condition.

## 2022-08-04 ENCOUNTER — ANESTHESIA (OUTPATIENT)
Dept: SURGERY | Facility: MEDICAL CENTER | Age: 37
End: 2022-08-04
Payer: COMMERCIAL

## 2022-08-04 ENCOUNTER — ANESTHESIA EVENT (OUTPATIENT)
Dept: SURGERY | Facility: MEDICAL CENTER | Age: 37
End: 2022-08-04
Payer: COMMERCIAL

## 2022-08-04 LAB
BASOPHILS # BLD AUTO: 0.3 % (ref 0–1.8)
BASOPHILS # BLD: 0.02 K/UL (ref 0–0.12)
CRP SERPL HS-MCNC: 1.95 MG/DL (ref 0–0.75)
EOSINOPHIL # BLD AUTO: 0.08 K/UL (ref 0–0.51)
EOSINOPHIL NFR BLD: 1.1 % (ref 0–6.9)
ERYTHROCYTE [DISTWIDTH] IN BLOOD BY AUTOMATED COUNT: 46.8 FL (ref 35.9–50)
FUNGUS SPEC FUNGUS STN: NORMAL
GRAM STN SPEC: NORMAL
HCG SERPL QL: NEGATIVE
HCT VFR BLD AUTO: 37.9 % (ref 37–47)
HGB BLD-MCNC: 12.6 G/DL (ref 12–16)
IMM GRANULOCYTES # BLD AUTO: 0.03 K/UL (ref 0–0.11)
IMM GRANULOCYTES NFR BLD AUTO: 0.4 % (ref 0–0.9)
LYMPHOCYTES # BLD AUTO: 1.54 K/UL (ref 1–4.8)
LYMPHOCYTES NFR BLD: 21.6 % (ref 22–41)
MCH RBC QN AUTO: 32.4 PG (ref 27–33)
MCHC RBC AUTO-ENTMCNC: 33.2 G/DL (ref 33.6–35)
MCV RBC AUTO: 97.4 FL (ref 81.4–97.8)
MONOCYTES # BLD AUTO: 0.87 K/UL (ref 0–0.85)
MONOCYTES NFR BLD AUTO: 12.2 % (ref 0–13.4)
NEUTROPHILS # BLD AUTO: 4.59 K/UL (ref 2–7.15)
NEUTROPHILS NFR BLD: 64.4 % (ref 44–72)
NRBC # BLD AUTO: 0 K/UL
NRBC BLD-RTO: 0 /100 WBC
PLATELET # BLD AUTO: 152 K/UL (ref 164–446)
PMV BLD AUTO: 11.8 FL (ref 9–12.9)
RBC # BLD AUTO: 3.89 M/UL (ref 4.2–5.4)
SIGNIFICANT IND 70042: NORMAL
SIGNIFICANT IND 70042: NORMAL
SITE SITE: NORMAL
SITE SITE: NORMAL
SOURCE SOURCE: NORMAL
SOURCE SOURCE: NORMAL
WBC # BLD AUTO: 7.1 K/UL (ref 4.8–10.8)

## 2022-08-04 PROCEDURE — 700111 HCHG RX REV CODE 636 W/ 250 OVERRIDE (IP): Performed by: ANESTHESIOLOGY

## 2022-08-04 PROCEDURE — 700102 HCHG RX REV CODE 250 W/ 637 OVERRIDE(OP): Performed by: ANESTHESIOLOGY

## 2022-08-04 PROCEDURE — 160048 HCHG OR STATISTICAL LEVEL 1-5: Performed by: ORTHOPAEDIC SURGERY

## 2022-08-04 PROCEDURE — 700102 HCHG RX REV CODE 250 W/ 637 OVERRIDE(OP): Performed by: STUDENT IN AN ORGANIZED HEALTH CARE EDUCATION/TRAINING PROGRAM

## 2022-08-04 PROCEDURE — 36415 COLL VENOUS BLD VENIPUNCTURE: CPT

## 2022-08-04 PROCEDURE — 700105 HCHG RX REV CODE 258: Performed by: STUDENT IN AN ORGANIZED HEALTH CARE EDUCATION/TRAINING PROGRAM

## 2022-08-04 PROCEDURE — 160035 HCHG PACU - 1ST 60 MINS PHASE I: Performed by: ORTHOPAEDIC SURGERY

## 2022-08-04 PROCEDURE — 700111 HCHG RX REV CODE 636 W/ 250 OVERRIDE (IP): Performed by: STUDENT IN AN ORGANIZED HEALTH CARE EDUCATION/TRAINING PROGRAM

## 2022-08-04 PROCEDURE — 99225 PR SUBSEQUENT OBSERVATION CARE,LEVEL II: CPT | Mod: GC | Performed by: FAMILY MEDICINE

## 2022-08-04 PROCEDURE — 160038 HCHG SURGERY MINUTES - EA ADDL 1 MIN LEVEL 2: Performed by: ORTHOPAEDIC SURGERY

## 2022-08-04 PROCEDURE — 96366 THER/PROPH/DIAG IV INF ADDON: CPT

## 2022-08-04 PROCEDURE — 160009 HCHG ANES TIME/MIN: Performed by: ORTHOPAEDIC SURGERY

## 2022-08-04 PROCEDURE — 26020 DRAIN HAND TENDON SHEATH: CPT | Mod: RT | Performed by: ORTHOPAEDIC SURGERY

## 2022-08-04 PROCEDURE — 700101 HCHG RX REV CODE 250: Performed by: ANESTHESIOLOGY

## 2022-08-04 PROCEDURE — 87205 SMEAR GRAM STAIN: CPT | Mod: 91

## 2022-08-04 PROCEDURE — 85025 COMPLETE CBC W/AUTO DIFF WBC: CPT

## 2022-08-04 PROCEDURE — 87102 FUNGUS ISOLATION CULTURE: CPT

## 2022-08-04 PROCEDURE — A9270 NON-COVERED ITEM OR SERVICE: HCPCS | Performed by: STUDENT IN AN ORGANIZED HEALTH CARE EDUCATION/TRAINING PROGRAM

## 2022-08-04 PROCEDURE — 87075 CULTR BACTERIA EXCEPT BLOOD: CPT

## 2022-08-04 PROCEDURE — 86140 C-REACTIVE PROTEIN: CPT

## 2022-08-04 PROCEDURE — 99223 1ST HOSP IP/OBS HIGH 75: CPT | Mod: 57 | Performed by: ORTHOPAEDIC SURGERY

## 2022-08-04 PROCEDURE — 160027 HCHG SURGERY MINUTES - 1ST 30 MINS LEVEL 2: Performed by: ORTHOPAEDIC SURGERY

## 2022-08-04 PROCEDURE — 700105 HCHG RX REV CODE 258: Performed by: ANESTHESIOLOGY

## 2022-08-04 PROCEDURE — 26020 DRAIN HAND TENDON SHEATH: CPT | Mod: ASROC,RT | Performed by: PHYSICIAN ASSISTANT

## 2022-08-04 PROCEDURE — 160002 HCHG RECOVERY MINUTES (STAT): Performed by: ORTHOPAEDIC SURGERY

## 2022-08-04 PROCEDURE — 01810 ANES PX NRV MUSC F/ARM WRST: CPT | Performed by: ANESTHESIOLOGY

## 2022-08-04 PROCEDURE — 160036 HCHG PACU - EA ADDL 30 MINS PHASE I: Performed by: ORTHOPAEDIC SURGERY

## 2022-08-04 PROCEDURE — 87070 CULTURE OTHR SPECIMN AEROBIC: CPT

## 2022-08-04 PROCEDURE — G0378 HOSPITAL OBSERVATION PER HR: HCPCS

## 2022-08-04 PROCEDURE — A9270 NON-COVERED ITEM OR SERVICE: HCPCS | Performed by: ANESTHESIOLOGY

## 2022-08-04 RX ORDER — HYDROMORPHONE HYDROCHLORIDE 1 MG/ML
0.1 INJECTION, SOLUTION INTRAMUSCULAR; INTRAVENOUS; SUBCUTANEOUS
Status: DISCONTINUED | OUTPATIENT
Start: 2022-08-04 | End: 2022-08-04 | Stop reason: HOSPADM

## 2022-08-04 RX ORDER — HYDROMORPHONE HYDROCHLORIDE 1 MG/ML
0.2 INJECTION, SOLUTION INTRAMUSCULAR; INTRAVENOUS; SUBCUTANEOUS
Status: DISCONTINUED | OUTPATIENT
Start: 2022-08-04 | End: 2022-08-04 | Stop reason: HOSPADM

## 2022-08-04 RX ORDER — SODIUM CHLORIDE, SODIUM LACTATE, POTASSIUM CHLORIDE, CALCIUM CHLORIDE 600; 310; 30; 20 MG/100ML; MG/100ML; MG/100ML; MG/100ML
INJECTION, SOLUTION INTRAVENOUS CONTINUOUS
Status: DISCONTINUED | OUTPATIENT
Start: 2022-08-04 | End: 2022-08-04 | Stop reason: HOSPADM

## 2022-08-04 RX ORDER — MIDAZOLAM HYDROCHLORIDE 1 MG/ML
1 INJECTION INTRAMUSCULAR; INTRAVENOUS
Status: DISCONTINUED | OUTPATIENT
Start: 2022-08-04 | End: 2022-08-04 | Stop reason: HOSPADM

## 2022-08-04 RX ORDER — HYDRALAZINE HYDROCHLORIDE 20 MG/ML
5 INJECTION INTRAMUSCULAR; INTRAVENOUS
Status: DISCONTINUED | OUTPATIENT
Start: 2022-08-04 | End: 2022-08-04 | Stop reason: HOSPADM

## 2022-08-04 RX ORDER — KETOROLAC TROMETHAMINE 30 MG/ML
INJECTION, SOLUTION INTRAMUSCULAR; INTRAVENOUS PRN
Status: DISCONTINUED | OUTPATIENT
Start: 2022-08-04 | End: 2022-08-04 | Stop reason: SURG

## 2022-08-04 RX ORDER — ALBUTEROL SULFATE 2.5 MG/3ML
2.5 SOLUTION RESPIRATORY (INHALATION)
Status: DISCONTINUED | OUTPATIENT
Start: 2022-08-04 | End: 2022-08-04 | Stop reason: HOSPADM

## 2022-08-04 RX ORDER — HYDROMORPHONE HYDROCHLORIDE 1 MG/ML
0.4 INJECTION, SOLUTION INTRAMUSCULAR; INTRAVENOUS; SUBCUTANEOUS
Status: DISCONTINUED | OUTPATIENT
Start: 2022-08-04 | End: 2022-08-04 | Stop reason: HOSPADM

## 2022-08-04 RX ORDER — METOCLOPRAMIDE HYDROCHLORIDE 5 MG/ML
INJECTION INTRAMUSCULAR; INTRAVENOUS PRN
Status: DISCONTINUED | OUTPATIENT
Start: 2022-08-04 | End: 2022-08-04 | Stop reason: SURG

## 2022-08-04 RX ORDER — LABETALOL HYDROCHLORIDE 5 MG/ML
5 INJECTION, SOLUTION INTRAVENOUS
Status: DISCONTINUED | OUTPATIENT
Start: 2022-08-04 | End: 2022-08-04 | Stop reason: HOSPADM

## 2022-08-04 RX ORDER — MEPERIDINE HYDROCHLORIDE 25 MG/ML
6.25 INJECTION INTRAMUSCULAR; INTRAVENOUS; SUBCUTANEOUS
Status: DISCONTINUED | OUTPATIENT
Start: 2022-08-04 | End: 2022-08-04 | Stop reason: HOSPADM

## 2022-08-04 RX ORDER — ONDANSETRON 2 MG/ML
INJECTION INTRAMUSCULAR; INTRAVENOUS PRN
Status: DISCONTINUED | OUTPATIENT
Start: 2022-08-04 | End: 2022-08-04 | Stop reason: SURG

## 2022-08-04 RX ORDER — MIDAZOLAM HYDROCHLORIDE 1 MG/ML
INJECTION INTRAMUSCULAR; INTRAVENOUS PRN
Status: DISCONTINUED | OUTPATIENT
Start: 2022-08-04 | End: 2022-08-04 | Stop reason: SURG

## 2022-08-04 RX ORDER — DEXAMETHASONE SODIUM PHOSPHATE 4 MG/ML
INJECTION, SOLUTION INTRA-ARTICULAR; INTRALESIONAL; INTRAMUSCULAR; INTRAVENOUS; SOFT TISSUE PRN
Status: DISCONTINUED | OUTPATIENT
Start: 2022-08-04 | End: 2022-08-04 | Stop reason: SURG

## 2022-08-04 RX ORDER — ONDANSETRON 2 MG/ML
4 INJECTION INTRAMUSCULAR; INTRAVENOUS
Status: DISCONTINUED | OUTPATIENT
Start: 2022-08-04 | End: 2022-08-04 | Stop reason: HOSPADM

## 2022-08-04 RX ORDER — HYDROMORPHONE HYDROCHLORIDE 2 MG/ML
INJECTION, SOLUTION INTRAMUSCULAR; INTRAVENOUS; SUBCUTANEOUS PRN
Status: DISCONTINUED | OUTPATIENT
Start: 2022-08-04 | End: 2022-08-04 | Stop reason: SURG

## 2022-08-04 RX ORDER — HALOPERIDOL 5 MG/ML
1 INJECTION INTRAMUSCULAR
Status: DISCONTINUED | OUTPATIENT
Start: 2022-08-04 | End: 2022-08-04 | Stop reason: HOSPADM

## 2022-08-04 RX ORDER — DIPHENHYDRAMINE HYDROCHLORIDE 50 MG/ML
12.5 INJECTION INTRAMUSCULAR; INTRAVENOUS
Status: DISCONTINUED | OUTPATIENT
Start: 2022-08-04 | End: 2022-08-04 | Stop reason: HOSPADM

## 2022-08-04 RX ORDER — SODIUM CHLORIDE, SODIUM LACTATE, POTASSIUM CHLORIDE, CALCIUM CHLORIDE 600; 310; 30; 20 MG/100ML; MG/100ML; MG/100ML; MG/100ML
INJECTION, SOLUTION INTRAVENOUS
Status: DISCONTINUED | OUTPATIENT
Start: 2022-08-04 | End: 2022-08-04 | Stop reason: SURG

## 2022-08-04 RX ORDER — LIDOCAINE HYDROCHLORIDE 20 MG/ML
INJECTION, SOLUTION EPIDURAL; INFILTRATION; INTRACAUDAL; PERINEURAL PRN
Status: DISCONTINUED | OUTPATIENT
Start: 2022-08-04 | End: 2022-08-04 | Stop reason: SURG

## 2022-08-04 RX ADMIN — FENTANYL CITRATE 50 MCG: 50 INJECTION, SOLUTION INTRAMUSCULAR; INTRAVENOUS at 10:57

## 2022-08-04 RX ADMIN — SODIUM CHLORIDE, POTASSIUM CHLORIDE, SODIUM LACTATE AND CALCIUM CHLORIDE: 600; 310; 30; 20 INJECTION, SOLUTION INTRAVENOUS at 10:02

## 2022-08-04 RX ADMIN — DEXAMETHASONE SODIUM PHOSPHATE 8 MG: 4 INJECTION, SOLUTION INTRA-ARTICULAR; INTRALESIONAL; INTRAMUSCULAR; INTRAVENOUS; SOFT TISSUE at 10:08

## 2022-08-04 RX ADMIN — LIDOCAINE HYDROCHLORIDE 50 MG: 20 INJECTION, SOLUTION EPIDURAL; INFILTRATION; INTRACAUDAL at 10:02

## 2022-08-04 RX ADMIN — HYDROMORPHONE HYDROCHLORIDE 0.4 MG: 2 INJECTION INTRAMUSCULAR; INTRAVENOUS; SUBCUTANEOUS at 10:13

## 2022-08-04 RX ADMIN — FENTANYL CITRATE 50 MCG: 50 INJECTION, SOLUTION INTRAMUSCULAR; INTRAVENOUS at 10:13

## 2022-08-04 RX ADMIN — SENNOSIDES AND DOCUSATE SODIUM 2 TABLET: 50; 8.6 TABLET ORAL at 17:59

## 2022-08-04 RX ADMIN — PROPOFOL 150 MG: 10 INJECTION, EMULSION INTRAVENOUS at 10:06

## 2022-08-04 RX ADMIN — METOCLOPRAMIDE 10 MG: 5 INJECTION, SOLUTION INTRAMUSCULAR; INTRAVENOUS at 10:02

## 2022-08-04 RX ADMIN — SODIUM CHLORIDE 3 G: 900 INJECTION INTRAVENOUS at 17:59

## 2022-08-04 RX ADMIN — GLYCOPYRROLATE 0.1 MG: 0.2 INJECTION INTRAMUSCULAR; INTRAVENOUS at 10:02

## 2022-08-04 RX ADMIN — MIDAZOLAM HYDROCHLORIDE 2 MG: 1 INJECTION, SOLUTION INTRAMUSCULAR; INTRAVENOUS at 10:02

## 2022-08-04 RX ADMIN — FENTANYL CITRATE 25 MCG: 50 INJECTION, SOLUTION INTRAMUSCULAR; INTRAVENOUS at 11:51

## 2022-08-04 RX ADMIN — SODIUM CHLORIDE 3 G: 900 INJECTION INTRAVENOUS at 05:03

## 2022-08-04 RX ADMIN — HYDROCODONE BITARTRATE AND ACETAMINOPHEN 15 MG: 7.5; 325 SOLUTION ORAL at 10:56

## 2022-08-04 RX ADMIN — SODIUM CHLORIDE 3 G: 900 INJECTION INTRAVENOUS at 13:18

## 2022-08-04 RX ADMIN — FENTANYL CITRATE 25 MCG: 50 INJECTION, SOLUTION INTRAMUSCULAR; INTRAVENOUS at 11:25

## 2022-08-04 RX ADMIN — KETOROLAC TROMETHAMINE 30 MG: 30 INJECTION, SOLUTION INTRAMUSCULAR at 10:14

## 2022-08-04 RX ADMIN — FENTANYL CITRATE 50 MCG: 50 INJECTION, SOLUTION INTRAMUSCULAR; INTRAVENOUS at 10:02

## 2022-08-04 RX ADMIN — ONDANSETRON 4 MG: 2 INJECTION INTRAMUSCULAR; INTRAVENOUS at 10:02

## 2022-08-04 ASSESSMENT — PAIN SCALES - GENERAL: PAIN_LEVEL: 3

## 2022-08-04 ASSESSMENT — PAIN DESCRIPTION - PAIN TYPE: TYPE: ACUTE PAIN

## 2022-08-04 NOTE — ED NOTES
Med rec completed per patient  Allergies reviewed  No PO Antibiotics in the last 30 days     Patient received two prescriptions for antibiotics, however has not started them yet.

## 2022-08-04 NOTE — H&P
[facility] [place] [place]     PATIENT ID:  NAME:  Joycelyn Awan  MRN:               [id number]  YOB: 1985    Date of Admission: 8/3/2022     Attending: Larisa Warren MD    Resident: Alejandra Beltran MD     Primary Care Physician:  Nancy Dillard    CC:  Dog bite      HPI: Joycelyn Awan is a 36 y.o. female with no significant reported PMHx who presented following dog bite.     Joycelyn reports being bitten by a friends pit bull on Monday of this week. Initially the bit was not very bothersome, but unfortunately she experienced progressively worsening pain and significant increase in swelling and redness that prompted her to come to the ED last night. She was initiated on IV antibiotics and admitted for observation at 1:30 am this morning. She was evaluated by orhto Dr Huff who at that time did not believe the wound indicated operative management given that the injury was likely a crush injury and there were minimal signs of active infection at the time. She was discharged home on oral doxycycline and augmentin this morning and unfortunately her symptoms worsened significantly over the course of the day. She now has worsened swelling and associated pain as well as increasing discharge.     Dog was reportedly fully vaccinated by owner. Patient is up to date on tetanus, last booster was 4 years ago.     She denies any nausea, vomiting, fevers, chills diarrhea at home. She has no other associated injuries.       ERCourse:  Vitals all within normal limits including temp 98.8F, pulse 98, RR 18, /88, and satuirating well without supplemental oxygen.     Labs were not redrawn on re-presentation to ED, however initial labs on admission early this morning show WBC 8.6, no left shift, ESR 6, CRP 1.37, normal CMP, negative MRSA swab.    Hand X ray showed soft tissue swelling of the second digit.     Patient was started on Unasyn IV in ED and given toradol 15 mg IV in ED.      Ortho was consulted from ED and Dr Walsh is planning for washout tomorrow AM.     REVIEW OF SYSTEMS:   Ten systems reviewed and were negative except as noted in the HPI.                PAST MEDICAL HISTORY:  Past Medical History:   Diagnosis Date   • Functional ovarian cysts        PAST SURGICAL HISTORY:  History reviewed. No pertinent surgical history.    FAMILY HISTORY:  Family History   Problem Relation Age of Onset   • No Known Problems Mother    • Diabetes Father         type II   • Depression Father    • Cancer Maternal Grandmother         colon cancer   • Cancer Maternal Grandfather         colon cancer   • Lung Disease Father        SOCIAL HISTORY:   Social History:   Tobacco: denies   Alcohol: denies  Recreational drugs (illegal and prescription): denies  Activity Level: Independent with activities of daily living.  Living situation: Lives at home with Daughter and    Recent travel:  Denies.  Primary Care Provider: Dr. Nancy Dillard  Other (stressors, spirituality, exposures):  denies    DIET:   Orders Placed This Encounter   Procedures   • Diet NPO Restrict to: Strict     Standing Status:   Standing     Number of Occurrences:   8     Order Specific Question:   Diet NPO Restrict to:     Answer:   Strict [1]       ALLERGIES:  No Known Allergies    OUTPATIENT MEDICATIONS:    Current Facility-Administered Medications:   •  ampicillin/sulbactam (UNASYN) 3 g in  mL IVPB, 3 g, Intravenous, Once, Sada Galvin M.D.  •  ketorolac (TORADOL) injection 15 mg, 15 mg, Intravenous, Once, Sada Galvin M.D.  •  senna-docusate (PERICOLACE or SENOKOT S) 8.6-50 MG per tablet 2 Tablet, 2 Tablet, Oral, BID **AND** polyethylene glycol/lytes (MIRALAX) PACKET 1 Packet, 1 Packet, Oral, QDAY PRN **AND** magnesium hydroxide (MILK OF MAGNESIA) suspension 30 mL, 30 mL, Oral, QDAY PRN **AND** bisacodyl (DULCOLAX) suppository 10 mg, 10 mg, Rectal, QDAY PRN, Alejandra Staley M.D.  •  acetaminophen  "(Tylenol) tablet 650 mg, 650 mg, Oral, Q6HRS PRN, Alejandra Staley M.D.    Current Outpatient Medications:   •  therapeutic multivitamin-minerals (THERAGRAN-M) Tab, Take 1 Tablet by mouth every day., Disp: , Rfl:   •  Ascorbic Acid (VITAMIN C PO), Take 1 Tablet by mouth every day., Disp: , Rfl:   •  amoxicillin-clavulanate (AUGMENTIN) 875-125 MG Tab, Take 1 Tablet by mouth every 12 hours for 7 days., Disp: 14 Tablet, Rfl: 0  •  doxycycline monohydrate (ADOXA) 100 MG tablet, Take 1 Tablet by mouth every 12 hours for 7 days., Disp: 14 Tablet, Rfl: 0    PHYSICAL EXAM:  Vitals:    22 1738 22 1739   BP: 115/88    Pulse: 98    Resp: 18    Temp: 37.1 °C (98.8 °F)    TempSrc: Temporal    SpO2: 97%    Weight:  93 kg (205 lb 0.4 oz)   Height:  1.727 m (5' 8\")   , Temp (24hrs), Av.9 °C (98.5 °F), Min:36.7 °C (98 °F), Max:37.1 °C (98.8 °F)  , Pulse Oximetry: 97 %, O2 Delivery Device: None - Room Air    General: Pt resting in NAD, cooperative   Skin:  Pink, warm and dry.  No rashes  HEENT: NC/AT. PERRL. EOMI. MMM. No nasal discharge. Oropharynx nonerythematous without exudate/plaques  Neck:  Supple without lymphadenopathy or rigidity.   Lungs:  Symmetrical.  CTAB with no W/R/R.  Good air movement   Cardiovascular:  S1/S2 RRR without murmurs  Abdomen:  Abdomen is soft, nontender, nondistended. +BS. No masses noted.  Extremities:  R index finger with swelling to the base of the second and third digits. Associated erythema present around dorsum of hand. ROM intact but limited due to swelling and pain. puncture wounds present over PIP joint over palmar aspect and dorsal aspect of the finger.   Spine:  Straight without vertebral anomalies.  CNS:  Muscle tone is normal. No gross focal neurologic deficits      LAB TESTS:   Recent Labs     22  0132   WBC 8.6   RBC 4.17*   HEMOGLOBIN 13.7   HEMATOCRIT 40.3   MCV 96.6   MCH 32.9   RDW 46.1   PLATELETCT 153*   MPV 11.5   NEUTSPOLYS 75.90*   LYMPHOCYTES 15.60* "   MONOCYTES 7.70   EOSINOPHILS 0.20   BASOPHILS 0.30         Recent Labs     08/03/22  0132   SODIUM 138   POTASSIUM 3.7   CHLORIDE 104   CO2 22   BUN 11   CREATININE 0.67   CALCIUM 9.1   ALBUMIN 4.3       CULTURES:   Results     ** No results found for the last 168 hours. **          IMAGES:  No orders to display       CONSULTS:   Orhtopedic surgery Dr Walsh     ASSESSMENT/PLAN:   36 y.o. female admitted for wound infection following dog bite.     # Wound infection  S/p dog bite 2 days ago. Signs of puncture wound indicate source of infection given progressive edema, erythema, warmth and drainage. UTD on tetanus. Concerning for infected hand wound following dig bite given poor defervescence after IV antibiotics this morning.      - Admit to medsur under observation    - IV unasyn    - Tylenol PO and toradol IV for pain control   - Orthopedics consulted, appreciate Dr Walsh's interventions and recommendations.     To OR tomorrow am for washout    NPO at midnight    Pharmacologic DVT prophylaxis held   - Consider wound cultures if appropriate.       Core Measures:   Fluids: None, ADAT and NPO after midnight  Lines: PIV  Abx: Unasyn   DVT prophylaxis: mechanical  Code Status: Full      Alejandra Beltran MD  PGY-3  [facility]

## 2022-08-04 NOTE — PROGRESS NOTES
"[facility] MEDICINE PROGRESS NOTE     Attending: Larisa Warren MD    Resident: Yosi Lopez MD    PATIENT: Joycelyn Awan; [id number]; 1985    ID: 36 y.o. female hospital day 1 admitted for wound infection after dog bite    SUBJECTIVE: No acute events overnight, scheduled for surgical washout with orthopedics this morning. Pain in finger improved with pain medications, now slowly increasing again.  Otherwise feels well without concerns.    OBJECTIVE:     Vitals:    08/03/22 2105 08/03/22 2115 08/04/22 0453 08/04/22 0500   BP: 123/79  120/81 123/80   Pulse: 98  (!) 120 99   Resp:   20 18   Temp: 36.9 °C (98.5 °F)  36.4 °C (97.6 °F)    TempSrc: Temporal  Temporal    SpO2: 100%  99% 98%   Weight: 94.1 kg (207 lb 7.3 oz) 94.1 kg (207 lb 7.3 oz)     Height: 1.728 m (5' 8.03\")        No intake or output data in the 24 hours ending 08/04/22 0704    PE:   General: No acute distress, resting comfortably in bed.  HEENT: NC/AT. PERRLA. EOMI. MMM  Cardiovascular: Normal S1/S2, RRR, no m/r/g  Respiratory: Symmetric inspiratory effort. CTAB with no adventitious sounds  Abdomen: BS+, soft, NT/ND   EXT:  R index finger lesion with surrounding skin discoloration and substantial edema.  Pain with any motion of finger.  Neuro: Non focal with no numbness, tingling or changes in sensation    LABS:  Recent Labs     08/03/22 0132 08/04/22  0105   WBC 8.6 7.1   RBC 4.17* 3.89*   HEMOGLOBIN 13.7 12.6   HEMATOCRIT 40.3 37.9   MCV 96.6 97.4   MCH 32.9 32.4   RDW 46.1 46.8   PLATELETCT 153* 152*   MPV 11.5 11.8   NEUTSPOLYS 75.90* 64.40   LYMPHOCYTES 15.60* 21.60*   MONOCYTES 7.70 12.20   EOSINOPHILS 0.20 1.10   BASOPHILS 0.30 0.30     Recent Labs     08/03/22  0132   SODIUM 138   POTASSIUM 3.7   CHLORIDE 104   CO2 22   BUN 11   CREATININE 0.67   CALCIUM 9.1   ALBUMIN 4.3     Estimated GFR/CRCL = Estimated Creatinine Clearance: 137.9 mL/min (by C-G formula based on SCr of 0.67 mg/dL).  Recent Labs     08/03/22  0132   GLUCOSE 124* "     Recent Labs     08/03/22  0132   ASTSGOT 12   ALTSGPT 7   TBILIRUBIN 0.4   ALKPHOSPHAT 47   GLOBULIN 2.4             No results for input(s): INR, APTT, FIBRINOGEN in the last 72 hours.    Invalid input(s): DIMER    MICROBIOLOGY:   No cultures drawn    IMAGING:   DX Hand Right  IMPRESSION:     1.  No evidence of acute fracture or dislocation.  2.  Soft tissue swelling of the second digit.    MEDS:  Current Facility-Administered Medications   Medication Last Admin   • senna-docusate (PERICOLACE or SENOKOT S) 8.6-50 MG per tablet 2 Tablet      And   • polyethylene glycol/lytes (MIRALAX) PACKET 1 Packet      And   • magnesium hydroxide (MILK OF MAGNESIA) suspension 30 mL      And   • bisacodyl (DULCOLAX) suppository 10 mg     • acetaminophen (Tylenol) tablet 650 mg     • ampicillin/sulbactam (UNASYN) 3 g in  mL IVPB Stopped at 08/04/22 0533   • ketorolac (TORADOL) injection 15 mg         PROBLEM LIST:  No problems updated.    ASSESSMENT/PLAN: Joycelyn Awan is a 36 y.o. female Admitted for wound debridement and management of L index finger extensor tenosynovitis following dog bite.    #R Index Finger Extensor Tenosynovitis  #Dog Bite  Worsening yesterday after d/c with Abx  OR for wound debridement this morning  Postop will need IV Abx at least through the night, consider transition to PO tomorrow if she is improving  Pain management postop  Restart regular diet      Core Measures:  Fluids: PO  Lines: PIV  Abx: Unasyn  Diet: Regular  PPX: Mechanical  DISPO: Inpatient for IV Abx for tenosynovitis    CODE STATUS: FULL CODE    Yosi Lopez MD  PGY4  [facility]

## 2022-08-04 NOTE — CARE PLAN
Problem: Knowledge Deficit - Standard  Goal: Patient and family/care givers will demonstrate understanding of plan of care, disease process/condition, diagnostic tests and medications  Outcome: Progressing     Problem: Pain - Standard  Goal: Alleviation of pain or a reduction in pain to the patient’s comfort goal  Outcome: Progressing   The patient is Stable - Low risk of patient condition declining or worsening         Progress made toward(s) clinical / shift goals:  asks appropriate questions    Patient is not progressing towards the following goals:

## 2022-08-04 NOTE — PROGRESS NOTES
4 Eyes Skin Assessment Completed by MELISSA Aguiar and MELISSA Peres.    Head WDL  Ears WDL  Nose WDL  Mouth WDL  Neck WDL  Breast/Chest WDL  Shoulder Blades WDL  Spine WDL  (R) Arm/Elbow/Hand Redness edema bite index finger  (L) Arm/Elbow/Hand WDL  Abdomen WDL  Groin WDL  Scrotum/Coccyx/Buttocks WDL  (R) Leg WDL  (L) Leg WDL  (R) Heel/Foot/Toe WDL  (L) Heel/Foot/Toe WDL          Devices In Places Blood Pressure Cuff      Interventions In Place Pillows    Possible Skin Injury Yes    Pictures Uploaded Into Epic Yes  Wound Consult Placed N/A  RN Wound Prevention Protocol Ordered No

## 2022-08-04 NOTE — ED NOTES
Pt updated with the plan of care. piv established IV.   Medicated per mar order. Allergies verified.

## 2022-08-04 NOTE — ANESTHESIA PROCEDURE NOTES
Airway    Date/Time: 8/4/2022 10:07 AM  Performed by: Andrew Blank M.D.  Authorized by: Andrew Blank M.D.     Location:  OR  Urgency:  Elective  Indications for Airway Management:  Anesthesia      Spontaneous Ventilation: absent    Sedation Level:  Deep  Preoxygenated: Yes    Final Airway Type:  Supraglottic airway  Final Supraglottic Airway:  Standard LMA    Number of Attempts at Approach:  1

## 2022-08-04 NOTE — ED PROVIDER NOTES
infED Provider Note    Scribed for Sada Galvin M.D. by Frankie Kim. 8/3/2022  6:19 PM    Primary care provider: Nancy Dillard D.O.  Means of arrival: Walk-in  History obtained from: Patient  History limited by: None    CHIEF COMPLAINT  Chief Complaint   Patient presents with    Dog Bite     Pt got bit by a dog on Monday this week on R index finger. Pt was seen here yesterday and was told to come back if anything changed. Pt has increased redness to R index finger       HPI  Joycelyn Awan is a 36 y.o. female who presents to the Emergency Department for worsening dog bite to her right index finger 3 days ago. Patient states she was seen here earlier today at 1 AM for similar symptoms and was hospitalized today for IV antibiotics and monitoring. She was discharged later this morning with Augmentin and doxycycline BID prescription, which she started soon after being discharged. She was informed to return to the ED for worsening symptoms. She noticed worsening redness and swelling to her right index finger, which prompted her to visit the ED for further evaluation. Patient denies history of diabetes or any other major past medical history. Patient ate and drank prior to arrival to the ED. No allergies to medications noted.    REVIEW OF SYSTEMS  SKIN: Right index finger dog bite, right index finger redness and swelling.    See history of present illness. C. All other systems negative.    PAST MEDICAL HISTORY   has a past medical history of Functional ovarian cysts.    SURGICAL HISTORY  patient denies any surgical history    SOCIAL HISTORY  Social History     Tobacco Use    Smoking status: Former Smoker    Smokeless tobacco: Never Used   Vaping Use    Vaping Use: Never used   Substance Use Topics    Alcohol use: Yes    Drug use: Not Currently     Types: Marijuana     Comment: passive      Social History     Substance and Sexual Activity   Drug Use Not Currently    Types: Marijuana    Comment: passive  "      FAMILY HISTORY  Family History   Problem Relation Age of Onset    No Known Problems Mother     Diabetes Father         type II    Depression Father     Cancer Maternal Grandmother         colon cancer    Cancer Maternal Grandfather         colon cancer    Lung Disease Father        CURRENT MEDICATIONS  Home Medications       Reviewed by Mumtaz Buitrago (Pharmacy Tech) on 08/03/22 at 2018  Med List Status: Complete     Medication Last Dose Status   amoxicillin-clavulanate (AUGMENTIN) 875-125 MG Tab Not started Active   Ascorbic Acid (VITAMIN C PO) 8/2/2022 Active   doxycycline monohydrate (ADOXA) 100 MG tablet Not started Active   therapeutic multivitamin-minerals (THERAGRAN-M) Tab 8/2/2022 Active                    ALLERGIES  No Known Allergies    PHYSICAL EXAM  VITAL SIGNS: /88   Pulse 98   Temp 37.1 °C (98.8 °F) (Temporal)   Resp 18   Ht 1.727 m (5' 8\")   Wt 93 kg (205 lb 0.4 oz)   SpO2 97%   BMI 31.17 kg/m²     Constitutional: No distress  Skin: Erythema to the right index finger with an open lesion near the PIP joint. Blistering lesion proximal to that. Whole finger is red and swollen, unable to straighten or flex it. Redness extended beyond initial border drawn by admitting physician this morning with some red streaking of the right hand. See images below.                Extremities: See skin section above.  Vascular: See skin section above.  Neurologic: See skin section above.  Psychiatric: Affect normal     DIAGNOSTIC STUDIES / PROCEDURES  None    COURSE & MEDICAL DECISION MAKING  Nursing notes, VS, PMSFHx reviewed in chart.     6:19 PM - Patient seen and examined at bedside. Based off physical exam findings, patient's infection is worsening likely has Tenosynovitis. She will need Wound Irrigation. Patient will be treated with Unasyn. Paged Ortho. Labs will not be repeated because they were ordered this morning during her previous visit this morning. The plan for hospitalization was " discussed. Patient and/or family was given the opportunity to ask any questions. Patient and/or family verbalizes understanding and agreement to this plan of care.       6:45 PM I discussed the patient's case and the above findings with Dr. Walsh (Ortho) who will operate on the patient tomorrow morning and wants her NPO after midnight. Paged Hosptialist.    6:47 PM - Patient was reevaluated at bedside. Discussed consult with Ortho and the plan for admission for surgery tomorrow morning. Patient and/or family verbalizes understanding and agreement to this plan of care.       6:53 PM I discussed the patient's case and the above findings with [facility] who agrees to evaluate the patient for hospitalization. They have been made aware that the surgery is tomorrow morning and that labs were not repeated.    DISPOSITION:  Patient will be hospitalized by [facility] in guarded condition.     FINAL IMPRESSION  1. Dog bite of right hand, subsequent encounter    2. Tenosynovitis of finger          Frankie RAMIREZ (Hillaryibcarlos), am scribing for, and in the presence of, Sada Galvin M.D..    Electronically signed by: Frankie Kim (Scribe), 8/3/2022    ISada M.D. personally performed the services described in this documentation, as scribed by Frankie Kim in my presence, and it is both accurate and complete.    The note accurately reflects work and decisions made by me.  Sada Galvin M.D.  8/3/2022  9:33 PM

## 2022-08-04 NOTE — ANESTHESIA TIME REPORT
Anesthesia Start and Stop Event Times     Date Time Event    8/4/2022 0959 Ready for Procedure     1002 Anesthesia Start     1045 Anesthesia Stop        Responsible Staff  08/04/22    Name Role Begin End    Andrew Blank M.D. Anesth 1002 1045        Overtime Reason:  no overtime (within assigned shift)    Comments:

## 2022-08-04 NOTE — ED NOTES
Pt transported off unit with transport tech. Pt awake and breathing with even, unlabored respirations at time of transfer.

## 2022-08-04 NOTE — ANESTHESIA PREPROCEDURE EVALUATION
Case: [id number] Date/Time: 08/04/22 1011    Procedure: IRRIGATION AND DEBRIDEMENT, WOUND (Right Hand)    Location: [facility] OR 16 / SURGERY [facility]    Surgeons: YAS Prince H&P:  PAST MEDICAL HISTORY:   36 y.o. female who presents for Procedure(s) (LRB):  IRRIGATION AND DEBRIDEMENT, WOUND (Right).  She has current and past medical problems significant for:  Urine pregnancy negative  Able to climb 2 flights of stair without dyspnea or angina, > 4 METs     Past Medical History:   Diagnosis Date   • Functional ovarian cysts        SMOKING/ALCOHOL/RECREATIONAL DRUG USE:  Social History     Tobacco Use   • Smoking status: Former Smoker   • Smokeless tobacco: Never Used   Vaping Use   • Vaping Use: Never used   Substance Use Topics   • Alcohol use: Yes   • Drug use: Not Currently     Types: Marijuana     Comment: passive     Social History     Substance and Sexual Activity   Drug Use Not Currently   • Types: Marijuana    Comment: passive       PAST SURGICAL HISTORY:  History reviewed. No pertinent surgical history.    ALLERGIES:   No Known Allergies    MEDICATIONS:  No current facility-administered medications on file prior to encounter.     Current Outpatient Medications on File Prior to Encounter   Medication Sig Dispense Refill   • therapeutic multivitamin-minerals (THERAGRAN-M) Tab Take 1 Tablet by mouth every day.     • Ascorbic Acid (VITAMIN C PO) Take 1 Tablet by mouth every day.         LABS:  Lab Results   Component Value Date/Time    HEMOGLOBIN 12.6 08/04/2022 0105    HEMATOCRIT 37.9 08/04/2022 0105    WBC 7.1 08/04/2022 0105     Lab Results   Component Value Date/Time    SODIUM 138 08/03/2022 0132    POTASSIUM 3.7 08/03/2022 0132    CHLORIDE 104 08/03/2022 0132    CO2 22 08/03/2022 0132    GLUCOSE 124 (H) 08/03/2022 0132    BUN 11 08/03/2022 0132    CALCIUM 9.1 08/03/2022 0132       SARS-CoV2 result: Negative      PREVIOUS ANESTHETICS: See  EMR  __________________________________________    Relevant Problems   NEURO   (positive) History of abnormal cervical Pap smear   (positive) History of depression   (positive) History of ovarian cyst       Physical Exam    Airway   Mallampati: II  TM distance: >3 FB  Neck ROM: full       Cardiovascular - normal exam  Rhythm: regular  Rate: normal  (-) murmur     Dental - normal exam           Pulmonary - normal exam  Breath sounds clear to auscultation     Abdominal    Neurological - normal exam                 Anesthesia Plan    ASA 1       Plan - general       Airway plan will be LMA          Induction: intravenous    Postoperative Plan: Postoperative administration of opioids is intended.    Pertinent diagnostic labs and testing reviewed    Informed Consent:    Anesthetic plan and risks discussed with patient.    Use of blood products discussed with: patient whom consented to blood products.

## 2022-08-04 NOTE — ED TRIAGE NOTES
"Chief Complaint   Patient presents with   • Dog Bite     Pt got bit by a dog on Monday this week on R index finger. Pt was seen here yesterday and was told to come back if anything changed. Pt has increased redness to R index finger         Pt walk in for above. Pt states the swelling has gotten worse. There is a marlee from yesterday in the ER to note the swelling, which has not traveled past the line. Pt aox4, GCS 15.    /88   Pulse 98   Temp 37.1 °C (98.8 °F) (Temporal)   Resp 18   Ht 1.727 m (5' 8\")   Wt 93 kg (205 lb 0.4 oz)   SpO2 97%   BMI 31.17 kg/m²     "

## 2022-08-04 NOTE — NON-PROVIDER
[facility] FAMILY MEDICINE PROGRESS NOTE     Attending: MD Kelvin  Senior Resident: MD John  Manuel Resident: MD Kim (PGY-1)  Medical Student: Dimitrios Dickey (Ms3)    PATIENT: Joycelyn Awan; [id number]; 1985    Subjective: Joycelyn is a 37 y/o admitted for cellulitis of the right hand, index finger with possible articular involvement and/or tenosynovitis. She is doing well today, awaiting I&D. No interval events.     OBJECTIVE:  Temp:  [36.4 °C (97.6 °F)-37.1 °C (98.8 °F)] 36.8 °C (98.2 °F)  Pulse:  [] 76  Resp:  [11-20] 11  BP: (109-135)/(64-88) 109/64  SpO2:  [97 %-100 %] 99 %  No intake or output data in the 24 hours ending 08/04/22 1038    PE:  General: Well appearing female in no acute distress, resting on arrival to room  HEENT: Normocephalic, atraumatic  Cardiovascular: RRR, no murmurs, gallops, or rubs  Pulmonary: CTAB, symmetrical chest expansion, no rales, rhonchi, or wheezes  Abdominal: Normoactive bowel sounds, non-tender to palpation, no guarding, rigidity, or distension  Extremities: Right index finger with laceration and associated swelling, erythema, bruising over the lateral aspect, PIP joint. She does appear to be neurovascularly intact, distally. She is firing FDP, FDS, and EDC, though ROM through MCP, PIP, and DIP joints are all significantly diminished 2/2 pain.   Neurological: Alert and oriented    LABS:  Recent Labs     08/03/22 0132 08/04/22  0105   WBC 8.6 7.1   RBC 4.17* 3.89*   HEMOGLOBIN 13.7 12.6   HEMATOCRIT 40.3 37.9   MCV 96.6 97.4   MCH 32.9 32.4   RDW 46.1 46.8   PLATELETCT 153* 152*   MPV 11.5 11.8   NEUTSPOLYS 75.90* 64.40   LYMPHOCYTES 15.60* 21.60*   MONOCYTES 7.70 12.20   EOSINOPHILS 0.20 1.10   BASOPHILS 0.30 0.30     Recent Labs     08/03/22  0132   SODIUM 138   POTASSIUM 3.7   CHLORIDE 104   CO2 22   BUN 11   CREATININE 0.67   CALCIUM 9.1   ALBUMIN 4.3     Estimated GFR/CRCL = Estimated Creatinine Clearance: 137.9 mL/min (by C-G formula based on SCr of 0.67  mg/dL).  Recent Labs     08/03/22  0132   GLUCOSE 124*     Recent Labs     08/03/22  0132   ASTSGOT 12   ALTSGPT 7   TBILIRUBIN 0.4   ALKPHOSPHAT 47   GLOBULIN 2.4       IMAGING:   XR Hand- demonstrates no bony involvement.      MEDS:  Current Facility-Administered Medications   Medication Last Admin   • HYDROmorphone (Dilaudid) injection 0.1 mg      Or   • HYDROmorphone (Dilaudid) injection 0.2 mg      Or   • HYDROmorphone (Dilaudid) injection 0.4 mg     • HYDROcodone-acetaminophen 2.5-108 mg/5mL (HYCET) solution 7.5 mg      Or   • HYDROcodone-acetaminophen 2.5-108 mg/5mL (HYCET) solution 15 mg     • ondansetron (ZOFRAN) syringe/vial injection 4 mg     • diphenhydrAMINE (BENADRYL) injection 12.5 mg     • albuterol (PROVENTIL) 2.5mg/3ml nebulizer solution 2.5 mg     • lactated ringers infusion     • fentaNYL (SUBLIMAZE) injection 25 mcg      Or   • fentaNYL (SUBLIMAZE) injection 50 mcg     • meperidine (DEMEROL) injection 6.25 mg     • midazolam (Versed) injection 1 mg     • haloperidol lactate (HALDOL) injection 1 mg     • hydrALAZINE (APRESOLINE) injection 5 mg     • labetalol (NORMODYNE/TRANDATE) injection 5 mg     • ePHEDrine injection 5 mg     • [MAR Hold] senna-docusate (PERICOLACE or SENOKOT S) 8.6-50 MG per tablet 2 Tablet      And   • [MAR Hold] polyethylene glycol/lytes (MIRALAX) PACKET 1 Packet      And   • [MAR Hold] magnesium hydroxide (MILK OF MAGNESIA) suspension 30 mL      And   • [MAR Hold] bisacodyl (DULCOLAX) suppository 10 mg     • [MAR Hold] acetaminophen (Tylenol) tablet 650 mg     • [MAR Hold] ampicillin/sulbactam (UNASYN) 3 g in  mL IVPB Stopped at 08/04/22 0533   • [MAR Hold] ketorolac (TORADOL) injection 15 mg       Facility-Administered Medications Ordered in Other Encounters   Medication Last Admin   • midazolam (Versed) injection 2 mg at 08/04/22 1002   • fentaNYL (SUBLIMAZE) injection 50 mcg at 08/04/22 1013   • glycopyrrolate (ROBINUL) injection 0.1 mg at 08/04/22 1002   •  metoclopramide (REGLAN) injection 10 mg at 08/04/22 1002   • ondansetron (ZOFRAN) syringe/vial injection 4 mg at 08/04/22 1002   • lidocaine PF (XYLOCAINE-MPF) 2 % injection PF 50 mg at 08/04/22 1002   • propofol (DIPRIVAN) injection 150 mg at 08/04/22 1006   • dexamethasone (DECADRON) injection 8 mg at 08/04/22 1008   • HYDROmorphone (DILAUDID) injection 0.4 mg at 08/04/22 1013   • ketorolac (TORADOL) injection 30 mg at 08/04/22 1014         ASSESSMENT/PLAN: Joycelyn is a 36 y.o. female admitted on 8/4 for suspected cellulitis of the right hand, index finger, with possible articular involvement and/or tenosynovitis.      #Cellulitis  #Tenosynovitis  -I&D today with Dr. Walsh  -MRSA negative  -Plan for postoperative course of Augmentin 875:125 x 14 days following surgery, with adjustments to be made if necessary pending culture results.   -Anticipate discharge today, pending anesthesia emergence tolerance, PO tolerance, and postoperative course without incident.     #Diet / Fluids  - Regular diet    #Bowel Regimen  - Senna/docusate, polyethylene glycol, milk of magnesia, bisacodyl PRN     #DVT Prophylaxis  -Enoxaparin. Transition to ASA outpatient x 1 week.     #Disposition  Inpatient for continued monitoring.       Dimitrios Dickey, MS3  [facility]

## 2022-08-04 NOTE — PROGRESS NOTES
Received patient in PPU 5 Orientedx4. Spouse at bedside. Resp even and unlabored Pt states pain right hand 3/10. Denies pain meds. Oriented patient to unit. Call light within reach. Instructed call nurse for need and before getting out of bed. Verbalized understanding.

## 2022-08-04 NOTE — OR NURSING
1033 Pt out from OR. Pt sleeping. VSS. Right finger with dressing in place is CDI, no signs of bleeding.  1053 Tolerating orals  1210 Report to Nelida TORRES  1215 Called and updated Jules, pts spouse, on POC  1228  Pt transported to PPU5 by transport.  Respirations even and unlabored and pain under control when pt left unit.

## 2022-08-04 NOTE — ANESTHESIA POSTPROCEDURE EVALUATION
Patient: Joycelyn Awan    Procedure Summary     Date: 08/04/22 Room / Location: [place] [id number] / SURGERY [facility]    Anesthesia Start: 1002 Anesthesia Stop: 1045    Procedure: IRRIGATION AND DEBRIDEMENT, WOUND (Right Hand) Diagnosis: (right hand dog bite)    Surgeons: Farooq Walsh M.D. Responsible Provider: Andrew Blank M.D.    Anesthesia Type: general ASA Status: 1          Final Anesthesia Type: general  Last vitals  BP   Blood Pressure: 139/84    Temp   36.8 °C (98.2 °F)    Pulse   79   Resp   17    SpO2   99 %      Anesthesia Post Evaluation    Patient location during evaluation: PACU  Patient participation: complete - patient participated  Level of consciousness: awake and alert  Pain score: 3    Airway patency: patent  Anesthetic complications: no  Cardiovascular status: hemodynamically stable  Respiratory status: acceptable  Hydration status: euvolemic    PONV: none          No complications documented.     Nurse Pain Score: 8 (NPRS)

## 2022-08-04 NOTE — OP REPORT
DATE: 8/4/2022    PREOPERATIVE DIAGNOSIS: 1.  Right index finger extensor tenosynovitis 2.  Right index finger flexor tenosynovitis    POSTOPERATIVE DIAGNOSIS: Same    PROCEDURE PERFORMED:  1.  Incision and drainage left index finger extensor tenosynovitis 2.  Incision and drainage left index finger flexor tenosynovitis    SURGEON: Farooq Walsh M.D.     ASSISTANT: Kaushal Valverde PA-C    ANESTHESIOLOGIST: MD Rosamaria    ANESTHESIA: General    ESTIMATED BLOOD LOSS: 0 mL    INDICATIONS: The patient is a 36 y.o. female with right index finger tenosynovitis after dog bite.  I discussed the risks and benefits of the procedure, including the risks of infection, wound healing complication, neurovascular injury, need for repeat irrigation and debridement and the medical risks of anesthesia including DVT, PE, MI, stroke, and death.  Benefits include eradication of infection and resolution of sepsis.  Alternatives to surgery were also discussed, including non-operative management.  The patient signed the informed consent and the operative extremity was marked.      PROCEDURE:  The patient underwent anesthesia, and was positioned supine on the operating room table and all bony prominences were well padded.  Preoperative antibiotics were held until cultures could be obtained. Sequential compression devices were employed. The correct operative site was prepped and draped into a sterile field. A procedural pause was conducted to verify correct patient, correct extremity, presence of the surgeons initials on the operative extremity.    A 3 cm dorsal incision was made over her puncture wound and purulent drainage was encountered.  This was sent for culture and sensitivity.  There was intra-articular penetration of the tendon sheath was clearly infected this was irrigated with cups amounts normal saline solution and closed with nylon suture.  Attention was then turned to the volar surface  Where a Irene type incision was  made over the flexor tendon and purulent drainage was encountered as well this was irrigated with copious amounts of normal saline solution and closed with nylon suture    The patient tolerated the procedure well. There were no apparent complications. All sponge, needle, and instrument counts were correct on two separate occasions. She was awakened, extubated, and transferred to the recovery room in satisfactory condition.     The use of Kaushal Valverde PA-C as a surgical assistant was necessary for assistance with exposure, retraction, and closure.    Post-Operative Plan:    1.  The patient should remain full weightbearing through heel  on their operative extremity.    2.  IV antibiotics - will be given postoperatively and adjusted by the Infectious Disease service as dictated by culture results.  3.  DVT prophylaxis - SCD's and Lovenox 40 mg SQ daily while inpatient.  The patient may transition to Aspirin 325 mg PO BID as an outpatient  4.  Discharge planning   ____________________________________   Farooq Walsh M.D.   DD: 8/4/2022  10:23 AM

## 2022-08-04 NOTE — CARE PLAN
The patient is Stable - Low risk of patient condition declining or worsening    Shift Goals  Clinical Goals: pain control  Patient Goals: NA    Progress made toward(s) clinical / shift goals:  Patient currently down in OR for wound debridement.

## 2022-08-04 NOTE — CONSULTS
"8/4/2022    The patient was seen at the request of Dr Galvin    HPI: Joycelyn Awan is a 36 y.o. female who presents with complaints of pain to right hand.  This started days ago after dog bite.  The pain is 7/10 and is described as sharp.  The pain is made worse by palpation of the area and made better by rest and immobilization.    Past Medical History:   Diagnosis Date   • Functional ovarian cysts        History reviewed. No pertinent surgical history.    Medications  No current facility-administered medications on file prior to encounter.     Current Outpatient Medications on File Prior to Encounter   Medication Sig Dispense Refill   • therapeutic multivitamin-minerals (THERAGRAN-M) Tab Take 1 Tablet by mouth every day.     • Ascorbic Acid (VITAMIN C PO) Take 1 Tablet by mouth every day.         Allergies  Patient has no known allergies.    ROS  Right hand pain. All other systems were reviewed and found to be negative    Family History   Problem Relation Age of Onset   • No Known Problems Mother    • Diabetes Father         type II   • Depression Father    • Cancer Maternal Grandmother         colon cancer   • Cancer Maternal Grandfather         colon cancer   • Lung Disease Father        Social History     Socioeconomic History   • Marital status:    Tobacco Use   • Smoking status: Former Smoker   • Smokeless tobacco: Never Used   Vaping Use   • Vaping Use: Never used   Substance and Sexual Activity   • Alcohol use: Yes   • Drug use: Not Currently     Types: Marijuana     Comment: passive   • Sexual activity: Yes     Partners: Male     Birth control/protection: I.U.D., Pill   Social History Narrative    ** Merged History Encounter **            Physical Exam  Vitals  /84   Pulse 95   Temp 36.6 °C (97.9 °F) (Temporal)   Resp 16   Ht 1.728 m (5' 8.03\")   Wt 94.1 kg (207 lb 7.3 oz)   SpO2 99%   General: Well Developed, Well Nourished, Age appropriate appearance  HEENT: Normocephalic, " atraumatic  Psych: Normal mood and affect  Neck: Supple, nontender, no masses  Lungs: Breathing unlabored, No audible wheezing  Heart: Regular heart rate and rhythm  Abdomen: Soft, NT, ND  Neuro: Sensation grossly intact to BUE and BLE, moving all four extremities  Skin: Intact, no open wounds  Vascular: 2+Rad/Ul, Capillary refill <2 seconds  MSK: Right index pain and swelling with any motion      Radiographs:  No orders to display       Laboratory Values  Recent Labs     08/03/22  0132 08/04/22  0105   WBC 8.6 7.1   RBC 4.17* 3.89*   HEMOGLOBIN 13.7 12.6   HEMATOCRIT 40.3 37.9   MCV 96.6 97.4   MCH 32.9 32.4   MCHC 34.0 33.2*   RDW 46.1 46.8   PLATELETCT 153* 152*   MPV 11.5 11.8     Recent Labs     08/03/22 0132   SODIUM 138   POTASSIUM 3.7   CHLORIDE 104   CO2 22   GLUCOSE 124*   BUN 11             Impression: Tenosynovitis right index finger    Plan:We discussed the diagnosis and findings with the patient at length.  We reviewed possible non operative and operative interventions and the risks and benefits of each of these.  she had a chance to ask questions and all of these were answered to her satisfaction. The patient chose to proceed with  operative intervention. Risks and benefits of surgery were discussed which include but are not limited to bleeding, infection, neurovascular damage, malunion, nonunion, instability, limb length discrepancy, DVT, PE, MI, Stroke and death. They understand these risks and wish to proceed.

## 2022-08-05 VITALS
RESPIRATION RATE: 16 BRPM | BODY MASS INDEX: 31.44 KG/M2 | DIASTOLIC BLOOD PRESSURE: 82 MMHG | SYSTOLIC BLOOD PRESSURE: 125 MMHG | OXYGEN SATURATION: 96 % | HEIGHT: 68 IN | HEART RATE: 83 BPM | TEMPERATURE: 98.3 F | WEIGHT: 207.45 LBS

## 2022-08-05 PROCEDURE — 99217 PR OBSERVATION CARE DISCHARGE: CPT | Mod: GC | Performed by: FAMILY MEDICINE

## 2022-08-05 PROCEDURE — G0378 HOSPITAL OBSERVATION PER HR: HCPCS

## 2022-08-05 PROCEDURE — 99024 POSTOP FOLLOW-UP VISIT: CPT | Performed by: ORTHOPAEDIC SURGERY

## 2022-08-05 PROCEDURE — 96366 THER/PROPH/DIAG IV INF ADDON: CPT

## 2022-08-05 PROCEDURE — A9270 NON-COVERED ITEM OR SERVICE: HCPCS | Performed by: STUDENT IN AN ORGANIZED HEALTH CARE EDUCATION/TRAINING PROGRAM

## 2022-08-05 PROCEDURE — 700111 HCHG RX REV CODE 636 W/ 250 OVERRIDE (IP): Performed by: STUDENT IN AN ORGANIZED HEALTH CARE EDUCATION/TRAINING PROGRAM

## 2022-08-05 PROCEDURE — 700105 HCHG RX REV CODE 258: Performed by: STUDENT IN AN ORGANIZED HEALTH CARE EDUCATION/TRAINING PROGRAM

## 2022-08-05 PROCEDURE — 700102 HCHG RX REV CODE 250 W/ 637 OVERRIDE(OP): Performed by: STUDENT IN AN ORGANIZED HEALTH CARE EDUCATION/TRAINING PROGRAM

## 2022-08-05 RX ADMIN — SODIUM CHLORIDE 3 G: 900 INJECTION INTRAVENOUS at 05:28

## 2022-08-05 RX ADMIN — ACETAMINOPHEN 650 MG: 325 TABLET, FILM COATED ORAL at 07:46

## 2022-08-05 RX ADMIN — SODIUM CHLORIDE 3 G: 900 INJECTION INTRAVENOUS at 12:38

## 2022-08-05 RX ADMIN — SODIUM CHLORIDE 3 G: 900 INJECTION INTRAVENOUS at 00:11

## 2022-08-05 ASSESSMENT — PAIN DESCRIPTION - PAIN TYPE
TYPE: ACUTE PAIN
TYPE: ACUTE PAIN

## 2022-08-05 NOTE — DISCHARGE INSTRUCTIONS
Discharge Instructions    Discharged to home by car with relative. Discharged via walking, hospital escort: Yes.  Special equipment needed: Not Applicable    Be sure to schedule a follow-up appointment with your primary care doctor or any specialists as instructed.     Discharge Plan:   Diet Plan: Discussed  Activity Level: Discussed  Confirmed Follow up Appointment: Patient to Call and Schedule Appointment  Confirmed Symptoms Management: Discussed  Medication Reconciliation Updated: Yes    I understand that a diet low in cholesterol, fat, and sodium is recommended for good health. Unless I have been given specific instructions below for another diet, I accept this instruction as my diet prescription.       Special Instructions: None    -Is this patient being discharged with medication to prevent blood clots?  No    Is patient discharged on Warfarin / Coumadin?   No

## 2022-08-05 NOTE — CARE PLAN
The patient is Stable - Low risk of patient condition declining or worsening    Shift Goals  Clinical Goals: Pain control  Patient Goals: Safety and dc  Family Goals: n/a    Progress made toward(s) clinical / shift goals: Pt is AAOx4, VSS on RA. PRN tylenol managing pain well. IV Unasyn q6h for R index finger infection. To be dc'd home on PO abx today.      Problem: Knowledge Deficit - Standard  Goal: Patient and family/care givers will demonstrate understanding of plan of care, disease process/condition, diagnostic tests and medications  Outcome: Progressing     Problem: Pain - Standard  Goal: Alleviation of pain or a reduction in pain to the patient’s comfort goal  Outcome: Progressing

## 2022-08-05 NOTE — PROGRESS NOTES
Home meds returned to pt by pharmacy. Discharge instructions given to patient at bedside, verbalizes understanding and states plans for follow-up with PCP and ortho surgeon. New and home medication review, post-discharge activity level and worsening of symptoms needing follow-up care discussed. IV cathlon removed. All belongings accounted for, all questions answered at this time. Patient escorted out to ED exit where mother arrived to drive her home.

## 2022-08-05 NOTE — PROGRESS NOTES
Report received from AM RN at 1900. Pt sitting up in bed, calm. AOX4. Denies pain at this time. POC discussed with pt. Call light within reach.

## 2022-08-05 NOTE — CARE PLAN
The patient is Stable - Low risk of patient condition declining or worsening    Shift Goals  Clinical Goals: Pain mgmt  Patient Goals: `    Progress made toward(s) clinical / shift goals:    Problem: Knowledge Deficit - Standard  Goal: Patient and family/care givers will demonstrate understanding of plan of care, disease process/condition, diagnostic tests and medications  Outcome: Progressing     Problem: Pain - Standard  Goal: Alleviation of pain or a reduction in pain to the patient’s comfort goal  Outcome: Progressing  Note: Free from pain       Patient is not progressing towards the following goals:

## 2022-08-05 NOTE — PROGRESS NOTES
[facility] FAMILY MEDICINE PROGRESS NOTE        Attending:   Dr. Larisa Warren     Resident:   Senior: Yosi Lopez M.D. PGY-4  Manuel: Opal Alvarez M.D. PGY-1     PATIENT:   Joycelyn Awan; [id number]; 1985    ID:   36 y.o. female admitted 8/3/22 for worsening wound infection of right second digit despite seen in the ED 3 days ago given outpatient amoxicillin/clauvante 8/1/22.     SUBJECTIVE:   Pt was on IV Unasyn over night per surgery recommendation. Pt reports she was able to tolerate food and po fluids last night as well. Denies fever, chills, nausea, vomiting, or pain to index finger this morning.     OBJECTIVE:  Vitals:    08/04/22 1900 08/05/22 0000 08/05/22 0500 08/05/22 0808   BP: 118/79 101/54 (!) 99/58 125/82   Pulse: 94 84 80 83   Resp: 16 18 16 16   Temp: 36.8 °C (98.3 °F) 36.5 °C (97.7 °F) 36.3 °C (97.3 °F) 36.8 °C (98.3 °F)   TempSrc: Temporal Temporal Temporal Temporal   SpO2: 97% 96% 95% 96%   Weight:       Height:           Intake/Output Summary (Last 24 hours) at 8/5/2022 0929  Last data filed at 8/4/2022 2100  Gross per 24 hour   Intake 1185 ml   Output 5 ml   Net 1180 ml       PHYSICAL EXAM:  General: No acute distress, afebrile, resting comfortably  HEENT: NC/AT. EOMI.   Cardiovascular: RRR without murmurs. Normal capillary refill   Respiratory: CTAB  Abdomen: soft, nontender, nondistended, no masses  EXT:  Right index finger wrapped in surgical bandage, CAMACHO, no edema  Skin: No erythema/lesions   Neuro: Non-focal    LABS:  Recent Labs     08/03/22  0132 08/04/22  0105   WBC 8.6 7.1   RBC 4.17* 3.89*   HEMOGLOBIN 13.7 12.6   HEMATOCRIT 40.3 37.9   MCV 96.6 97.4   MCH 32.9 32.4   RDW 46.1 46.8   PLATELETCT 153* 152*   MPV 11.5 11.8   NEUTSPOLYS 75.90* 64.40   LYMPHOCYTES 15.60* 21.60*   MONOCYTES 7.70 12.20   EOSINOPHILS 0.20 1.10   BASOPHILS 0.30 0.30     Recent Labs     08/03/22  0132   SODIUM 138   POTASSIUM 3.7   CHLORIDE 104   CO2 22   BUN 11   CREATININE 0.67   CALCIUM 9.1   ALBUMIN  4.3     Estimated GFR/CRCL = Estimated Creatinine Clearance: 137.9 mL/min (by C-G formula based on SCr of 0.67 mg/dL).  Recent Labs     08/03/22  0132   GLUCOSE 124*     Recent Labs     08/03/22 0132   ASTSGOT 12   ALTSGPT 7   TBILIRUBIN 0.4   ALKPHOSPHAT 47   GLOBULIN 2.4             No results for input(s): INR, APTT, FIBRINOGEN in the last 72 hours.    Invalid input(s): DIMER      IMAGING:  No orders to display       MEDS:  Current Facility-Administered Medications   Medication Last Admin   • senna-docusate (PERICOLACE or SENOKOT S) 8.6-50 MG per tablet 2 Tablet 2 Tablet at 08/04/22 1759    And   • polyethylene glycol/lytes (MIRALAX) PACKET 1 Packet      And   • magnesium hydroxide (MILK OF MAGNESIA) suspension 30 mL      And   • bisacodyl (DULCOLAX) suppository 10 mg     • acetaminophen (Tylenol) tablet 650 mg 650 mg at 08/05/22 0746   • ampicillin/sulbactam (UNASYN) 3 g in  mL IVPB Stopped at 08/05/22 0558   • ketorolac (TORADOL) injection 15 mg         ASSESSMENT/PLAN:Joycelyn Awan is a 36 y.o. female Admitted for wound debridement and management of L index finger extensor tenosynovitis following dog bite.     #R index Finger Extensor Tenosynovitis   #Dog Bite   -Surgical debridement of wound yesterday.   -IV Unasyn antibiotics   -Pt tolerated food and fluids po last night and this morning  Plan:   -Discharge patient on amoxicillin/clauvante, pt has medication currently and has only had one dose since receiving medication. Continue medication outpatient.   -Outpatient general surgery follow up.  -Pending wound cultures, can follow up outpatient     Core Measures:  Fluids: PO          Lines: None  Abx:  Unasyn   Diet: Regular   PPX:  Mechanical   CODE STATUS: Full code  DISPO:  Inpatient, plan to discharge patient today     Opal Alvarez M.D. PGY-1  [facility]

## 2022-08-05 NOTE — PROGRESS NOTES
"OR cultures still pending    /82   Pulse 83   Temp 36.8 °C (98.3 °F) (Temporal)   Resp 16   Ht 1.728 m (5' 8.03\")   Wt 94.1 kg (207 lb 7.3 oz)   SpO2 96%     Recent Labs     08/03/22  0132 08/04/22  0105   WBC 8.6 7.1   RBC 4.17* 3.89*   HEMOGLOBIN 13.7 12.6   HEMATOCRIT 40.3 37.9   MCV 96.6 97.4   MCH 32.9 32.4   MCHC 34.0 33.2*   RDW 46.1 46.8   PLATELETCT 153* 152*   MPV 11.5 11.8       POD#1  S/P I&D finger    Plan:  DVT Prophylaxis- TEDS/SCDs  Weight Bearing Status-WBAT  PT/OT  Antibiotics: per ID  Case Coordination          "

## 2022-08-05 NOTE — DISCHARGE SUMMARY
[facility] DISCHARGE SUMMARY     ADMIT DATE: 8/3/2022       DISCHARGE DATE: 8/5/2022    SERVICE: [facility]  ATTENDING PHYSICIAN: Larisa Warren M.d.   SENIOR RESIDENT: Yosi Lopez M.D. PGY-4  TANIA RESIDENT: Opal Alvarez M.D. PGY-1     FINAL DIAGNOSES:   1. Dog bite of right hand, subsequent encounter     2. Tenosynovitis of finger      right index        HOSPITAL COURSE:   Patient was admitted 8/03/22 for worsening right second digit infection secondary to dog bite 8/1/22. She was seen in the ED on 8/1/22 and was discharged on 5 day course of amoxicillin/clauvanate. On admission on 8/3/22 pt was started on IV Unasyn. Patient remained afebrile with unremarkable vital signs throughout admission. Wound cultures pending. Orthopedic Surgery consulted and performed wound debridement of finger 8/4/22. Orthopedic Surgery advised close follow up and to continue amoxicillin/clauvante outpatient. She tolerating procedure well. She was able to move finger well without pain after the procedure. Wound cultures are still pending, pt is to follow up with orthopedic surgery in 1-2 weeks. Pt is to continue amoxicillin-clavulanate 875-125 mg tablet 5 day course.     CONSULTANTS:   Orthopedic Surgery     PROCEDURES:   Wound debridement     IMAGING/ LABS:   DX-Right Hand   -No evidence of acute fracture or dislocation.   -Soft tissue swelling of the second digit     DISCHARGE MEDICATIONS:     Medication Reconciliation Completed    Amox    DISPOSITION: stable     DIET: regular diet    ACTIVITY: ad doc    DISCHARGE LABS:    Recent Labs     08/03/22  0132 08/04/22  0105   WBC 8.6 7.1   RBC 4.17* 3.89*   HEMOGLOBIN 13.7 12.6   HEMATOCRIT 40.3 37.9   MCV 96.6 97.4   MCH 32.9 32.4   RDW 46.1 46.8   PLATELETCT 153* 152*   MPV 11.5 11.8   NEUTSPOLYS 75.90* 64.40   LYMPHOCYTES 15.60* 21.60*   MONOCYTES 7.70 12.20   EOSINOPHILS 0.20 1.10   BASOPHILS 0.30 0.30     No results found for: [id number], FOLATE, FERRITIN, IRON,  TOTIRONBC    Estimated GFR/CRCL = Estimated Creatinine Clearance: 137.9 mL/min (by C-G formula based on SCr of 0.67 mg/dL).  Recent Labs     08/03/22  0132   SODIUM 138   POTASSIUM 3.7   CHLORIDE 104   CO2 22   BUN 11   CREATININE 0.67   CALCIUM 9.1   ALBUMIN 4.3       Recent Labs     08/03/22  0132   ALTSGPT 7   ASTSGOT 12   ALKPHOSPHAT 47   TBILIRUBIN 0.4   ALBUMIN 4.3   GLOBULIN 2.4       No results for input(s): POCGLUCOSE in the last 72 hours.  No results found for: HBA1C, TSH, FREET4, FREET3, CORTISOL    INSTRUCTIONS:   The patient was instructed to return to the ER in the event of worsening swelling, erythema, or pain. I have counseled the patient on the importance of compliance of her PO antibiotics and the patient has agreed to proceed with all medical recommendations and follow up plan indicated above.   The patient understands that all medications come with benefits and risks. Risks may include permanent injury or death and these risks can be minimized with close reassessment and monitoring.        PCP:  Nancy Dillard D.O.  Copy of discharge summary given to the patient    F/U APPOINTMENT:   Dr. Walsh (General Surgery) for follow up in 1-2 weeks as well as primary care with Dr. Dillard or [facility].    PENDING STUDIES:  Wound culture pending

## 2022-08-07 LAB
BACTERIA SPEC ANAEROBE CULT: NORMAL
BACTERIA WND AEROBE CULT: ABNORMAL
BACTERIA WND AEROBE CULT: ABNORMAL
GRAM STN SPEC: ABNORMAL
SIGNIFICANT IND 70042: ABNORMAL
SIGNIFICANT IND 70042: NORMAL
SITE SITE: ABNORMAL
SITE SITE: NORMAL
SOURCE SOURCE: ABNORMAL
SOURCE SOURCE: NORMAL

## 2022-08-08 ENCOUNTER — OFFICE VISIT (OUTPATIENT)
Dept: URGENT CARE | Facility: PHYSICIAN GROUP | Age: 37
End: 2022-08-08
Payer: COMMERCIAL

## 2022-08-08 VITALS
HEART RATE: 97 BPM | HEIGHT: 68 IN | RESPIRATION RATE: 18 BRPM | WEIGHT: 209 LBS | OXYGEN SATURATION: 94 % | SYSTOLIC BLOOD PRESSURE: 124 MMHG | BODY MASS INDEX: 31.67 KG/M2 | DIASTOLIC BLOOD PRESSURE: 78 MMHG | TEMPERATURE: 99.4 F

## 2022-08-08 DIAGNOSIS — Z98.890 POST-OPERATIVE STATE: ICD-10-CM
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
## 2022-08-08 PROCEDURE — 99213 OFFICE O/P EST LOW 20 MIN: CPT | Performed by: PHYSICIAN ASSISTANT

## 2022-08-08 RX ORDER — DOXYCYCLINE 100 MG/1
TABLET ORAL
COMMUNITY
Start: 2022-08-03 | End: 2023-01-03

## 2022-08-08 RX ORDER — AMOXICILLIN AND CLAVULANATE POTASSIUM 875; 125 MG/1; MG/1
TABLET, FILM COATED ORAL
COMMUNITY
Start: 2022-08-03 | End: 2023-01-03

## 2022-08-08 ASSESSMENT — FIBROSIS 4 INDEX: FIB4 SCORE: 1.1

## 2022-08-08 ASSESSMENT — ENCOUNTER SYMPTOMS
TINGLING: 0
MYALGIAS: 0
CHILLS: 0
SENSORY CHANGE: 0
FEVER: 0

## 2022-08-08 NOTE — PROGRESS NOTES
"Subjective:   Joycelyn Awan  is a 36 y.o. female who presents for Dog Bite (Possible infected,left hand,index finger,x1 week)      Other  This is a recurrent problem. The current episode started in the past 7 days. Pertinent negatives include no chills, fever, myalgias or rash.   Patient was seen 1 week ago for dog bite to left hand.  She was evaluated to the emergency department and then returned the next day.  She was kept for an overnight stay in the hospital as well as seen by orthopedics for irrigation and debridement of dog bite wound.  Cultures were obtained and grew out Pasteurella.  Patient had been discharged the second time with Augmentin and doxycycline (also having had IV Unasyn while hospitalized).  She returns to clinic having realized Tdap was not given at time of ER visit and some concern for jaw pain possibly representing early symptoms of tetanus.  Patient reports her tetanus was updated in 2018.  Patient reports dog was fully vaccinated.  She does report less swelling less redness and pain at left index finger since I&D procedure.  Patient had follow-up with PCP scheduled for tomorrow but has been canceled.  Is scheduled to see orthopedics for follow-up next week.    Review of Systems   Constitutional: Negative for chills and fever.   Musculoskeletal: Negative for myalgias.   Skin: Negative for rash.        Dog bite left hand, left index   Neurological: Negative for tingling and sensory change.       No Known Allergies     Objective:   /78 (BP Location: Left arm, Patient Position: Sitting, BP Cuff Size: Adult)   Pulse 97   Temp 37.4 °C (99.4 °F) (Temporal)   Resp 18   Ht 1.727 m (5' 8\")   Wt 94.8 kg (209 lb)   SpO2 94%   BMI 31.78 kg/m²     Physical Exam  Vitals and nursing note reviewed.   Constitutional:       General: She is not in acute distress.     Appearance: She is well-developed. She is not diaphoretic.   HENT:      Head: Normocephalic and atraumatic.      Right " Ear: External ear normal.      Left Ear: External ear normal.      Nose: Nose normal.   Eyes:      General: Lids are normal. No scleral icterus.        Right eye: No discharge.         Left eye: No discharge.      Conjunctiva/sclera: Conjunctivae normal.   Pulmonary:      Effort: Pulmonary effort is normal. No respiratory distress.   Musculoskeletal:         General: Normal range of motion.      Cervical back: Neck supple.      Comments: Mild edema and ecchymosis at area of last weeks I&D, no sign of worsened cellulitis, no extension of erythema, no purulent drainage (patient notes decreased edema)   Skin:     General: Skin is warm and dry.      Coloration: Skin is not pale.      Findings: No erythema.   Neurological:      Mental Status: She is alert and oriented to person, place, and time. She is not disoriented.   Psychiatric:         Speech: Speech normal.         Behavior: Behavior normal.     Tdap is up-to-date through 2028    Dressing changed in urgent care.  Patient tolerates well.    Assessment/Plan:   1. Dog bite, subsequent encounter    2. Post-operative state    Other orders  - amoxicillin-clavulanate (AUGMENTIN) 875-125 MG Tab; TAKE 1 TABLET BY MOUTH EVERY 12 HOURS FOR 7 DAYS  - doxycycline monohydrate (ADOXA) 100 MG tablet; TAKE 1 TABLET BY MOUTH EVERY 12 HOURS FOR 7 DAYS    Dressing change, patient sent with additional supplies for dressing change  Finish course of antibiotics  Follow-up with orthopedics next week  Dressing changes reviewed with patient  ER precautions with any worsening symptoms are reviewed with patient/caregiver and they do express understanding    I have worn an N95 mask, gloves and eye protection for the entire encounter with this patient.     Differential diagnosis, natural history, supportive care, and indications for immediate follow-up discussed.

## 2022-08-09 ENCOUNTER — APPOINTMENT (OUTPATIENT)
Dept: MEDICAL GROUP | Facility: PHYSICIAN GROUP | Age: 37
End: 2022-08-09
Payer: COMMERCIAL

## 2022-08-16 NOTE — DOCUMENTATION QUERY
[facility]                                                                       Query Response Note      PATIENT:               DENISE BOOKER  ACCT #:                  [id number]  MRN:                     [id number]  :                      1985  ADMIT DATE:       8/3/2022 6:03 PM  DISCH DATE:        2022 2:53 PM  RESPONDING  PROVIDER #:        [id number]           QUERY TEXT:    There is conflicting documentation in the medical record. Please clarify laterality for the Index finger.      Based on treatment, clinical findings and risk factors, can this documentation be further clarified?     (conflicting documentation placed in brackets)    The patient's Clinical Indicators include:  Pre-operative and Post-operative diagnosis   1. Right index finger extensor tenosynovitis 2.  Right index finger flexor tenosynovitis    Procedure   1. Incision and drainage left index finger extensor tenosynovitis 2.  Incision and drainage left index finger flexor tenosynovitis  Procedure note:  A 3 cm dorsal incision was made over her puncture wound and purulent drainage was encountered. This was sent for culture and sensitivity.  There was intra-articular penetration of the tendon sheath was clearly infected this was irrigated with cups amounts normal saline solution and closed with nylon suture. Attention was then turned to the volar surface where a Irene type incision was made over the flexor tendon and purulent drainage was encountered as well this was irrigated with copious amounts of normal saline solution and closed with nylon suture. The patient tolerated the procedure well.    :  [email or web address]  Options provided:   -- Right index finger extensor tenosynovitis, Right index finger flexor tenosynovitis with I&D of this finger   -- Left index finger extensor tenosynovitis, Left index finger flexor tenosynovitis  with I&D of this finger   -- Unable to determine   -- Unable to determine      Query created by: Melissa Stack on 8/16/2022 3:34 PM    RESPONSE TEXT:    Right index finger extensor tenosynovitis, Right index finger flexor tenosynovitis with I&D of this finger          Electronically signed by:  YENNIFER ROQUE MD 8/16/2022 3:49 PM

## 2022-08-30 LAB
FUNGUS SPEC CULT: NORMAL
FUNGUS SPEC FUNGUS STN: NORMAL
SIGNIFICANT IND 70042: NORMAL
SITE SITE: NORMAL
SOURCE SOURCE: NORMAL

## 2022-12-27 ENCOUNTER — TELEPHONE (OUTPATIENT)
Dept: SCHEDULING | Facility: IMAGING CENTER | Age: 37
End: 2022-12-27

## 2023-01-03 ENCOUNTER — OFFICE VISIT (OUTPATIENT)
Dept: MEDICAL GROUP | Facility: PHYSICIAN GROUP | Age: 38
End: 2023-01-03
Payer: COMMERCIAL

## 2023-01-03 VITALS
SYSTOLIC BLOOD PRESSURE: 100 MMHG | WEIGHT: 209.8 LBS | TEMPERATURE: 99.9 F | HEIGHT: 68 IN | OXYGEN SATURATION: 95 % | HEART RATE: 87 BPM | DIASTOLIC BLOOD PRESSURE: 72 MMHG | BODY MASS INDEX: 31.8 KG/M2

## 2023-01-03 DIAGNOSIS — R20.2 NUMBNESS AND TINGLING: ICD-10-CM

## 2023-01-03 DIAGNOSIS — R79.89 ABNORMAL CBC: ICD-10-CM

## 2023-01-03 DIAGNOSIS — Z00.00 ENCOUNTER FOR MEDICAL EXAMINATION TO ESTABLISH CARE: ICD-10-CM

## 2023-01-03 DIAGNOSIS — Z13.1 SCREENING FOR DIABETES MELLITUS: ICD-10-CM

## 2023-01-03 DIAGNOSIS — Z86.59 HISTORY OF DEPRESSION: ICD-10-CM

## 2023-01-03 DIAGNOSIS — R20.0 NUMBNESS AND TINGLING: ICD-10-CM

## 2023-01-03 DIAGNOSIS — Z97.5 IUD (INTRAUTERINE DEVICE) IN PLACE: ICD-10-CM

## 2023-01-03 DIAGNOSIS — K92.1 BLOOD IN STOOL: ICD-10-CM

## 2023-01-03 DIAGNOSIS — Z87.42 HISTORY OF OVARIAN CYST: ICD-10-CM

## 2023-01-03 DIAGNOSIS — H92.02 LEFT EAR PAIN: ICD-10-CM

## 2023-01-03 DIAGNOSIS — H65.92 MIDDLE EAR EFFUSION, LEFT: ICD-10-CM

## 2023-01-03 DIAGNOSIS — F41.9 ANXIETY: ICD-10-CM

## 2023-01-03 PROBLEM — L08.9 WOUND INFECTION: Status: RESOLVED | Noted: 2022-08-03 | Resolved: 2023-01-03

## 2023-01-03 PROBLEM — T14.8XXA WOUND INFECTION: Status: RESOLVED | Noted: 2022-08-03 | Resolved: 2023-01-03

## 2023-01-03 PROCEDURE — 99215 OFFICE O/P EST HI 40 MIN: CPT

## 2023-01-03 RX ORDER — FLUTICASONE PROPIONATE 50 MCG
1 SPRAY, SUSPENSION (ML) NASAL DAILY
Qty: 16 G | Refills: 0 | Status: SHIPPED | OUTPATIENT
Start: 2023-01-03 | End: 2023-03-01

## 2023-01-03 ASSESSMENT — FIBROSIS 4 INDEX: FIB4 SCORE: 1.133893419027681682

## 2023-01-03 ASSESSMENT — PATIENT HEALTH QUESTIONNAIRE - PHQ9: CLINICAL INTERPRETATION OF PHQ2 SCORE: 0

## 2023-01-03 NOTE — PROGRESS NOTES
Subjective:     CC:    Chief Complaint   Patient presents with    Establish Care       HISTORY OF THE PRESENT ILLNESS: Joycelyn is a pleasant 37 y.o. female here today to establish care and inquire about referrals to an ENT as well as GI. Her prior PCP was Dr. Nancy Dillard.    Patient states about 1 week ago, she noticed bright red blood in her stool and in the toilet water.  She does have external hemorrhoids, however, she has a family history of colon issues with both her grandparents and so is inquiring about colon cancer screenings.  She did have a colonoscopy in her mid 20s.  There were no concerns at that time.    Patient also states around Thanksgiving, she started to notice a sharp shooting pain in her left ear followed by a dull ache.  Patient states its only temporary.  She is inquiring about a referral to an ENT.    She does have a history of abnormal Pap smear maybe 10-15 years ago.  Reports consecutive Paps since that time have been normal.  No concerns or symptoms from a well woman standpoint at this time.  She does have an IUD in place (ParaGard). She states it is due to be removed in 2026.  She also has a history of a ruptured ovarian cyst.  No recent issues with that.    She also has a history with anxiety and depression.  She reports that many years ago she was actually seriously depressed and admitted to [facility].  At that time she was on antidepressants, but she states she weaned herself off about 10 years ago.  She does not desire to go back on antidepressants or any psychiatric medication.  She states as far as her anxiety, she started going to [employer] and her anxiety resolved.  Neither of these are an issue at this time.    Health Maintenance: Completed    ROS:   All systems negative except as addressed in assessment and plan.       Objective:     Exam: /72 (BP Location: Left arm, Patient Position: Sitting, BP Cuff Size: Adult)   Pulse 87   Temp 37.7 °C (99.9  "°F) (Temporal)   Ht 1.727 m (5' 8\")   Wt 95.2 kg (209 lb 12.8 oz)   SpO2 95%  Body mass index is 31.9 kg/m².    Physical Exam  Vitals reviewed.   Constitutional:       Appearance: Normal appearance.   HENT:      Right Ear: Tympanic membrane normal.      Ears:      Comments: +Middle ear effusion  Cardiovascular:      Rate and Rhythm: Normal rate.      Pulses: Normal pulses.      Heart sounds: Normal heart sounds.   Pulmonary:      Effort: Pulmonary effort is normal.      Breath sounds: Normal breath sounds.   Abdominal:      General: Abdomen is flat.      Palpations: Abdomen is soft.   Musculoskeletal:         General: Normal range of motion.   Skin:     General: Skin is warm and dry.   Neurological:      Mental Status: Mental status is at baseline.   Psychiatric:         Mood and Affect: Mood normal.         Behavior: Behavior normal.       Labs: Reviewed from 08/04/22      Assessment & Plan:   37 y.o. female with the following -    1. Encounter for medical examination to establish care  Health conditions and medications reviewed and updated. All screenings discussed and up-to-date. Health maintenance completed.     - Lipid Profile; Future  - HEMOGLOBIN A1C; Future  - CBC WITH DIFFERENTIAL; Future  - Comp Metabolic Panel; Future  - VITAMIN D,25 HYDROXY (DEFICIENCY); Future  - VIT B12,  FOLIC ACID    2. Blood in stool  - Referral to GI for Colonoscopy    3. Left ear pain  4. Middle ear effusion, left  - Referral to ENT  - fluticasone (FLONASE) 50 MCG/ACT nasal spray; Administer 1 Spray into affected nostril(S) every day.  Dispense: 16 g; Refill: 0    5. History of depression  6. Anxiety  Chronic, stable.  Continue with non-pharmacologic interventions.    7. History of ovarian cyst  8. IUD (intrauterine device) in place  Chronic, stable.  Due to be removed in 2026.     9. Abnormal CBC  - CBC WITH DIFFERENTIAL; Future    10. Screening for diabetes mellitus  - HEMOGLOBIN A1C; Future  - Comp Metabolic Panel; " Future    11. Numbness and tingling  Patient states this was a problem in 2020, but she started taking B12 and the problem resolved.  Will reviews labs.  - VITAMIN D,25 HYDROXY (DEFICIENCY); Future  - VIT B12,  FOLIC ACID    Patient was educated in proper administration of medication(s) ordered today including safety, possible SE, risks, benefits, rationale and alternatives to therapy.   Supportive care, differential diagnoses, and indications for immediate follow-up discussed with patient.    Pathogenesis of diagnosis discussed including typical length and natural progression.    Instructed to return to clinic or nearest emergency department for any change in condition, further concerns, or worsening of symptoms.  Patient states understanding of the plan of care and discharge instructions.    Return in about 1 year (around 1/3/2024) for Wellness Visit, Lab Review.    I spent a total of 50 minutes with record review, exam, and communication with the patient, communication with other providers, and documentation of this encounter. This does not include time spent on separately billable procedures/tests.    I have placed the above orders and discussed them with an approved delegating provider.  The MA is performing the below orders under the direction of Dr. Quiroga.    Please note that this dictation was created using voice recognition software. I have worked with consultants from the vendor as well as technical experts from [facility] [employer] to optimize the interface. I have made every reasonable attempt to correct obvious errors, but I expect that there are errors of grammar and possibly content that I did not discover before finalizing the note.

## 2023-01-04 ENCOUNTER — OFFICE VISIT (OUTPATIENT)
Dept: URGENT CARE | Facility: PHYSICIAN GROUP | Age: 38
End: 2023-01-04
Payer: COMMERCIAL

## 2023-01-04 VITALS
OXYGEN SATURATION: 99 % | DIASTOLIC BLOOD PRESSURE: 68 MMHG | BODY MASS INDEX: 31.37 KG/M2 | SYSTOLIC BLOOD PRESSURE: 104 MMHG | TEMPERATURE: 98.8 F | WEIGHT: 207 LBS | HEART RATE: 98 BPM | RESPIRATION RATE: 18 BRPM | HEIGHT: 68 IN

## 2023-01-04 DIAGNOSIS — J06.9 UPPER RESPIRATORY TRACT INFECTION, UNSPECIFIED TYPE: ICD-10-CM

## 2023-01-04 LAB
INT CON NEG: NORMAL
INT CON POS: NORMAL
S PYO AG THROAT QL: NEGATIVE

## 2023-01-04 PROCEDURE — 87880 STREP A ASSAY W/OPTIC: CPT | Performed by: PHYSICIAN ASSISTANT

## 2023-01-04 PROCEDURE — 99213 OFFICE O/P EST LOW 20 MIN: CPT | Performed by: PHYSICIAN ASSISTANT

## 2023-01-04 ASSESSMENT — ENCOUNTER SYMPTOMS
FEVER: 0
NAUSEA: 0
VOMITING: 0
CHILLS: 0
SORE THROAT: 0
ABDOMINAL PAIN: 0
HEADACHES: 0
SINUS PAIN: 0
MYALGIAS: 0
COUGH: 0

## 2023-01-04 ASSESSMENT — FIBROSIS 4 INDEX: FIB4 SCORE: 1.133893419027681682

## 2023-01-04 NOTE — PROGRESS NOTES
Subjective:     CHIEF COMPLAINT  Chief Complaint   Patient presents with    Pharyngitis     Cough,x10 days       HPI  Joycelyn Awan is a very pleasant 37 y.o. female who presents to the clinic requesting strep testing.  Patient states her daughter was recently exposed to strep pharyngitis and has been experiencing mild sore throat, cough and congestion.  Patient is currently asymptomatic.  States she had a flulike illness last week that since resolved.  Currently not experiencing any pain on swallowing.  No cough, shortness of breath or chest pain.  No recent fevers.    REVIEW OF SYSTEMS  Review of Systems   Constitutional:  Negative for chills and fever.   HENT:  Positive for ear pain. Negative for congestion, sinus pain and sore throat.    Respiratory:  Negative for cough.    Gastrointestinal:  Negative for abdominal pain, nausea and vomiting.   Musculoskeletal:  Negative for myalgias.   Skin:  Negative for rash.   Neurological:  Negative for headaches.     PAST MEDICAL HISTORY  Patient Active Problem List    Diagnosis Date Noted    Blood in stool 01/03/2023    Left ear pain 01/03/2023    Middle ear effusion, left 01/03/2023    Cellulitis 08/03/2022    Obesity (BMI 30.0-34.9) 08/03/2022    Anxiety 07/21/2020    History of depression 07/21/2020    History of abnormal cervical Pap smear 07/21/2020    History of ovarian cyst 07/21/2020    IUD (intrauterine device) in place 07/21/2020       SURGICAL HISTORY   has a past surgical history that includes irrigation & debridement general (Right, 8/4/2022).    ALLERGIES  No Known Allergies    CURRENT MEDICATIONS  Home Medications       Reviewed by Rene Hartley P.A.-C. (Physician Assistant) on 01/04/23 at 1426  Med List Status: <None>     Medication Last Dose Status   fluticasone (FLONASE) 50 MCG/ACT nasal spray Taking Active                    SOCIAL HISTORY  Social History     Tobacco Use    Smoking status: Never    Smokeless tobacco: Never   Vaping Use     "Vaping Use: Never used   Substance and Sexual Activity    Alcohol use: Yes     Alcohol/week: 0.6 oz     Types: 1 Cans of beer per week    Drug use: Not Currently    Sexual activity: Yes     Partners: Male     Birth control/protection: I.U.D., Pill       FAMILY HISTORY  Family History   Problem Relation Age of Onset    No Known Problems Mother     Diabetes Father         type II    Depression Father     Cancer Maternal Grandmother         colon cancer    Cancer Maternal Grandfather         colon cancer    Lung Disease Father           Objective:     VITAL SIGNS: /68 (BP Location: Left arm, Patient Position: Sitting, BP Cuff Size: Adult)   Pulse 98   Temp 37.1 °C (98.8 °F) (Temporal)   Resp 18   Ht 1.727 m (5' 8\")   Wt 93.9 kg (207 lb)   SpO2 99%   BMI 31.47 kg/m²     PHYSICAL EXAM  Physical Exam  Constitutional:       General: She is not in acute distress.     Appearance: Normal appearance. She is not ill-appearing, toxic-appearing or diaphoretic.   HENT:      Head: Normocephalic and atraumatic.      Right Ear: Tympanic membrane, ear canal and external ear normal.      Left Ear: Tympanic membrane, ear canal and external ear normal.      Nose: Nose normal. No congestion or rhinorrhea.      Mouth/Throat:      Mouth: Mucous membranes are moist.      Pharynx: No oropharyngeal exudate or posterior oropharyngeal erythema.   Eyes:      Conjunctiva/sclera: Conjunctivae normal.   Cardiovascular:      Rate and Rhythm: Normal rate and regular rhythm.      Pulses: Normal pulses.      Heart sounds: Normal heart sounds.   Pulmonary:      Effort: Pulmonary effort is normal.   Musculoskeletal:      Cervical back: Normal range of motion. No muscular tenderness.   Skin:     General: Skin is warm and dry.      Capillary Refill: Capillary refill takes less than 2 seconds.   Neurological:      Mental Status: She is alert.   Psychiatric:         Mood and Affect: Mood normal.         Thought Content: Thought content normal. "     POCT strep: Negative    Assessment/Plan:     1. Upper respiratory tract infection, unspecified type      MDM/Comments:    Patient is currently asymptomatic.  Recovered from a URI last week.  Requesting strep testing.  Strep testing performed which did return negative.  Posterior oropharynx nonerythematous or edematous.    Differential diagnosis, natural history, supportive care, and indications for immediate follow-up discussed. All questions answered. Patient agrees with the plan of care.    Follow-up as needed if symptoms worsen or fail to improve to PCP, Urgent care or Emergency Room.    I have personally reviewed prior external notes and test results pertinent to today's visit.  I have independently reviewed and interpreted all diagnostics ordered during this urgent care acute visit.   Discussed management options (risks,benefits, and alternatives to treatment). Pt expresses understanding and the treatment plan was agreed upon. Questions were encouraged and answered to pt's satisfaction.    Please note that this dictation was created using voice recognition software. I have made a reasonable attempt to correct obvious errors, but I expect that there are errors of grammar and possibly content that I did not discover before finalizing the note.

## 2023-03-01 DIAGNOSIS — H92.02 LEFT EAR PAIN: ICD-10-CM

## 2023-03-01 RX ORDER — FLUTICASONE PROPIONATE 50 MCG
SPRAY, SUSPENSION (ML) NASAL
Qty: 16 G | Refills: 0 | Status: SHIPPED | OUTPATIENT
Start: 2023-03-01 | End: 2023-04-05

## 2023-04-05 ENCOUNTER — GYNECOLOGY VISIT (OUTPATIENT)
Dept: OBGYN | Facility: CLINIC | Age: 38
End: 2023-04-05
Payer: COMMERCIAL

## 2023-04-05 ENCOUNTER — HOSPITAL ENCOUNTER (OUTPATIENT)
Facility: MEDICAL CENTER | Age: 38
End: 2023-04-05
Attending: OBSTETRICS & GYNECOLOGY
Payer: COMMERCIAL

## 2023-04-05 DIAGNOSIS — Z01.419 WELL WOMAN EXAM WITH ROUTINE GYNECOLOGICAL EXAM: ICD-10-CM

## 2023-04-05 DIAGNOSIS — Z30.432 ENCOUNTER FOR IUD REMOVAL: ICD-10-CM

## 2023-04-05 PROCEDURE — 99385 PREV VISIT NEW AGE 18-39: CPT | Mod: 25 | Performed by: OBSTETRICS & GYNECOLOGY

## 2023-04-05 PROCEDURE — 58301 REMOVE INTRAUTERINE DEVICE: CPT | Performed by: OBSTETRICS & GYNECOLOGY

## 2023-04-05 PROCEDURE — 88175 CYTOPATH C/V AUTO FLUID REDO: CPT

## 2023-04-05 PROCEDURE — 87624 HPV HI-RISK TYP POOLED RSLT: CPT

## 2023-04-05 ASSESSMENT — FIBROSIS 4 INDEX: FIB4 SCORE: 1.133893419027681682

## 2023-04-05 NOTE — PROGRESS NOTES
Gynecology Annual    Joycelyn Awan    37 y.o.    Chief complaint    No chief complaint on file.      HPI    Patient is a 36 yo  who presents for gyn annual exam and pap. She also has had Paragard IUD since 2016 and desires removal of IUD as she desires return of fertility.   Reports having prolonged menstrual periods usually lasting 8 days and coming every 2 weeks.   Denies pelvic pain, abnormal discharge or changes in bowel/bladder.   Sexually active with . No issues, feels safe in marriage.     Consented for IUD removal. Risks of bleeding, infection, need for minor surgery to remove IUD discussed. Consent signed.     Review of Systems:  Review of Systems   All other systems reviewed and are negative.     Past Obstetrical History:     x 1    Past Gynecological History:    Last pap:  NILM  H/o abnormal pap: 18 years ago, normal since  H/o STIs: No  DEXA: N/A  Last Mammogram: N/A  LMP: Patient's last menstrual period was 2023.  BCM: Paragard IUD    Past Medical History    Past Medical History:   Diagnosis Date    Functional ovarian cysts     Wound infection 8/3/2022       Past Surgical History    Past Surgical History:   Procedure Laterality Date    IRRIGATION & DEBRIDEMENT GENERAL Right 2022    Procedure: IRRIGATION AND DEBRIDEMENT, WOUND;  Surgeon: Farooq Walsh M.D.;  Location: SURGERY [facility];  Service: Orthopedics       Family History   Problem Relation Age of Onset    No Known Problems Mother     Diabetes Father         type II    Depression Father     Cancer Maternal Grandmother         colon cancer    Cancer Maternal Grandfather         colon cancer    Lung Disease Father        Allergies    No Known Allergies    Medications    No current outpatient medications on file.     No current facility-administered medications for this visit.       Social    Social History     Tobacco Use    Smoking status: Never    Smokeless tobacco: Never   Vaping Use    Vaping Use:  Never used   Substance Use Topics    Alcohol use: Yes     Alcohol/week: 0.6 oz     Types: 1 Cans of beer per week    Drug use: Not Currently        OBJECTIVE:    Vitals    There were no vitals taken for this visit.    Physical Exam    GENERAL: Well developed, well nourished, female in no acute distress.    HEENT: NCAT, mucus membranes moist    Neck: Supple, nontender, no NOAH, no thyromegaly    Breasts: Symmetric, Nontender, no masses, no nipple discharge, no skin changes    CV: RRR    Pulm: CTAB    Abdomen: Soft ND NT.    Ext: CAMACHO    : Normal Vulva, vagina. No lesions present. No abnormal discharge. No blood.    Urethral meatus normal.    Cervix smooth pink no lesions, abnormal discharge or blood. IUD strings noted at os. Pap collected.     Uterus small retroverted midline nontender    Adnexa no masses or tenderness.     Brief Procedure Note    Patient consented for IUD removal prior to procedure. Patient in lithotomy as above.   Speculum placed, cervix viewed. Pap collected as above. IUD strings grasped with ring forceps and removed with gentle traction.   No bleeding noted with IUD removal. Speculum removed. Patient tolerated well.     Labs/Pathology:    None    A/P    Patient Active Problem List   Diagnosis    Anxiety    History of depression    History of abnormal cervical Pap smear    History of ovarian cyst    IUD (intrauterine device) in place    Cellulitis    Obesity (BMI 30.0-34.9)    Blood in stool    Left ear pain    Middle ear effusion, left       Joycelyn Imani [place]    37 y.o.    No obstetric history on file.    1. Well woman exam with routine gynecological exam - f/u pap. Continue yearly pelvic and breast exam. Monthly self breast exam encouraged. F/u with PCP for routine health maintenance/exams.    2. Encounter for IUD removal - patient desires return of fertility.        RTC in 1 year or PRN.     Time spent: 30 minutes      Thao Matthews M.D.    Obstetrics and Gynecology    4/5/20239:31 AM

## 2023-08-24 ENCOUNTER — HOSPITAL ENCOUNTER (OUTPATIENT)
Dept: LAB | Facility: MEDICAL CENTER | Age: 38
End: 2023-08-24
Attending: MIDWIFE, LAY
Payer: COMMERCIAL

## 2023-08-24 LAB
25(OH)D3 SERPL-MCNC: 36 NG/ML (ref 30–100)
ABO GROUP BLD: NORMAL
APPEARANCE UR: CLEAR
BACTERIA #/AREA URNS HPF: NEGATIVE /HPF
BASOPHILS # BLD AUTO: 0.7 % (ref 0–1.8)
BASOPHILS # BLD: 0.05 K/UL (ref 0–0.12)
BILIRUB UR QL STRIP.AUTO: NEGATIVE
BLD GP AB SCN SERPL QL: NORMAL
COLOR UR: YELLOW
EOSINOPHIL # BLD AUTO: 0.12 K/UL (ref 0–0.51)
EOSINOPHIL NFR BLD: 1.7 % (ref 0–6.9)
EPI CELLS #/AREA URNS HPF: ABNORMAL /HPF
ERYTHROCYTE [DISTWIDTH] IN BLOOD BY AUTOMATED COUNT: 48.3 FL (ref 35.9–50)
GLUCOSE UR STRIP.AUTO-MCNC: NEGATIVE MG/DL
HBV SURFACE AG SER QL: NORMAL
HCT VFR BLD AUTO: 43.6 % (ref 37–47)
HGB BLD-MCNC: 14.3 G/DL (ref 12–16)
HIV 1+2 AB+HIV1 P24 AG SERPL QL IA: NORMAL
HYALINE CASTS #/AREA URNS LPF: ABNORMAL /LPF
IMM GRANULOCYTES # BLD AUTO: 0.02 K/UL (ref 0–0.11)
IMM GRANULOCYTES NFR BLD AUTO: 0.3 % (ref 0–0.9)
KETONES UR STRIP.AUTO-MCNC: NEGATIVE MG/DL
LEUKOCYTE ESTERASE UR QL STRIP.AUTO: ABNORMAL
LYMPHOCYTES # BLD AUTO: 1.8 K/UL (ref 1–4.8)
LYMPHOCYTES NFR BLD: 24.9 % (ref 22–41)
MCH RBC QN AUTO: 32.4 PG (ref 27–33)
MCHC RBC AUTO-ENTMCNC: 32.8 G/DL (ref 32.2–35.5)
MCV RBC AUTO: 98.9 FL (ref 81.4–97.8)
MICRO URNS: ABNORMAL
MONOCYTES # BLD AUTO: 0.44 K/UL (ref 0–0.85)
MONOCYTES NFR BLD AUTO: 6.1 % (ref 0–13.4)
NEUTROPHILS # BLD AUTO: 4.79 K/UL (ref 1.82–7.42)
NEUTROPHILS NFR BLD: 66.3 % (ref 44–72)
NITRITE UR QL STRIP.AUTO: NEGATIVE
NRBC # BLD AUTO: 0 K/UL
NRBC BLD-RTO: 0 /100 WBC (ref 0–0.2)
PH UR STRIP.AUTO: 5.5 [PH] (ref 5–8)
PLATELET # BLD AUTO: 161 K/UL (ref 164–446)
PMV BLD AUTO: 11.8 FL (ref 9–12.9)
PROT UR QL STRIP: NEGATIVE MG/DL
RBC # BLD AUTO: 4.41 M/UL (ref 4.2–5.4)
RBC # URNS HPF: ABNORMAL /HPF
RBC UR QL AUTO: ABNORMAL
RH BLD: NORMAL
RUBV AB SER QL: 82.1 IU/ML
SP GR UR STRIP.AUTO: 1.02
T PALLIDUM AB SER QL IA: NORMAL
UROBILINOGEN UR STRIP.AUTO-MCNC: 0.2 MG/DL
WBC # BLD AUTO: 7.2 K/UL (ref 4.8–10.8)
WBC #/AREA URNS HPF: ABNORMAL /HPF

## 2023-08-24 PROCEDURE — 87389 HIV-1 AG W/HIV-1&-2 AB AG IA: CPT

## 2023-08-24 PROCEDURE — 85025 COMPLETE CBC W/AUTO DIFF WBC: CPT

## 2023-08-24 PROCEDURE — 86850 RBC ANTIBODY SCREEN: CPT

## 2023-08-24 PROCEDURE — 36415 COLL VENOUS BLD VENIPUNCTURE: CPT

## 2023-08-24 PROCEDURE — 86780 TREPONEMA PALLIDUM: CPT

## 2023-08-24 PROCEDURE — 87522 HEPATITIS C REVRS TRNSCRPJ: CPT

## 2023-08-24 PROCEDURE — 82306 VITAMIN D 25 HYDROXY: CPT

## 2023-08-24 PROCEDURE — 87340 HEPATITIS B SURFACE AG IA: CPT

## 2023-08-24 PROCEDURE — 87077 CULTURE AEROBIC IDENTIFY: CPT

## 2023-08-24 PROCEDURE — 86900 BLOOD TYPING SEROLOGIC ABO: CPT

## 2023-08-24 PROCEDURE — 87086 URINE CULTURE/COLONY COUNT: CPT

## 2023-08-24 PROCEDURE — 86901 BLOOD TYPING SEROLOGIC RH(D): CPT

## 2023-08-24 PROCEDURE — 81001 URINALYSIS AUTO W/SCOPE: CPT

## 2023-08-24 PROCEDURE — 86762 RUBELLA ANTIBODY: CPT

## 2023-08-26 LAB
BACTERIA UR CULT: ABNORMAL
BACTERIA UR CULT: ABNORMAL
HCV RNA SERPL NAA+PROBE-ACNC: NOT DETECTED IU/ML
HCV RNA SERPL NAA+PROBE-LOG IU: NOT DETECTED LOG IU/ML
HCV RNA SERPL QL NAA+PROBE: NOT DETECTED
SIGNIFICANT IND 70042: ABNORMAL
SITE SITE: ABNORMAL
SOURCE SOURCE: ABNORMAL

## 2023-10-23 ENCOUNTER — DOCUMENTATION (OUTPATIENT)
Dept: HEALTH INFORMATION MANAGEMENT | Facility: OTHER | Age: 38
End: 2023-10-23
Payer: COMMERCIAL

## 2024-05-01 ENCOUNTER — HOSPITAL ENCOUNTER (OUTPATIENT)
Dept: LAB | Facility: MEDICAL CENTER | Age: 39
End: 2024-05-01
Attending: MIDWIFE, LAY
Payer: COMMERCIAL

## 2024-05-01 LAB
ABO GROUP BLD: NORMAL
APPEARANCE UR: CLEAR
BACTERIA #/AREA URNS HPF: ABNORMAL /HPF
BASOPHILS # BLD AUTO: 0.4 % (ref 0–1.8)
BASOPHILS # BLD: 0.03 K/UL (ref 0–0.12)
BILIRUB UR QL STRIP.AUTO: NEGATIVE
BLD GP AB SCN SERPL QL: NORMAL
C TRACH DNA SPEC QL NAA+PROBE: NEGATIVE
COLOR UR: YELLOW
EOSINOPHIL # BLD AUTO: 0.04 K/UL (ref 0–0.51)
EOSINOPHIL NFR BLD: 0.5 % (ref 0–6.9)
EPI CELLS #/AREA URNS HPF: ABNORMAL /HPF
ERYTHROCYTE [DISTWIDTH] IN BLOOD BY AUTOMATED COUNT: 46.5 FL (ref 35.9–50)
GLUCOSE UR STRIP.AUTO-MCNC: NEGATIVE MG/DL
HBV SURFACE AG SER QL: NORMAL
HCT VFR BLD AUTO: 38.3 % (ref 37–47)
HCV AB SER QL: NORMAL
HGB BLD-MCNC: 12.7 G/DL (ref 12–16)
HIV 1+2 AB+HIV1 P24 AG SERPL QL IA: NORMAL
HYALINE CASTS #/AREA URNS LPF: ABNORMAL /LPF
IMM GRANULOCYTES # BLD AUTO: 0.03 K/UL (ref 0–0.11)
IMM GRANULOCYTES NFR BLD AUTO: 0.4 % (ref 0–0.9)
KETONES UR STRIP.AUTO-MCNC: NEGATIVE MG/DL
LEUKOCYTE ESTERASE UR QL STRIP.AUTO: ABNORMAL
LYMPHOCYTES # BLD AUTO: 1.7 K/UL (ref 1–4.8)
LYMPHOCYTES NFR BLD: 22.2 % (ref 22–41)
MCH RBC QN AUTO: 32.6 PG (ref 27–33)
MCHC RBC AUTO-ENTMCNC: 33.2 G/DL (ref 32.2–35.5)
MCV RBC AUTO: 98.2 FL (ref 81.4–97.8)
MICRO URNS: ABNORMAL
MONOCYTES # BLD AUTO: 0.42 K/UL (ref 0–0.85)
MONOCYTES NFR BLD AUTO: 5.5 % (ref 0–13.4)
N GONORRHOEA DNA SPEC QL NAA+PROBE: NEGATIVE
NEUTROPHILS # BLD AUTO: 5.43 K/UL (ref 1.82–7.42)
NEUTROPHILS NFR BLD: 71 % (ref 44–72)
NITRITE UR QL STRIP.AUTO: NEGATIVE
NRBC # BLD AUTO: 0 K/UL
NRBC BLD-RTO: 0 /100 WBC (ref 0–0.2)
PH UR STRIP.AUTO: 6.5 [PH] (ref 5–8)
PLATELET # BLD AUTO: 165 K/UL (ref 164–446)
PMV BLD AUTO: 11.2 FL (ref 9–12.9)
PROT UR QL STRIP: NEGATIVE MG/DL
RBC # BLD AUTO: 3.9 M/UL (ref 4.2–5.4)
RBC # URNS HPF: ABNORMAL /HPF
RBC UR QL AUTO: NEGATIVE
RH BLD: NORMAL
RUBV AB SER QL: 65.4 IU/ML
SP GR UR STRIP.AUTO: 1.02
SPECIMEN SOURCE: NORMAL
T PALLIDUM AB SER QL IA: NORMAL
UROBILINOGEN UR STRIP.AUTO-MCNC: 0.2 MG/DL
WBC # BLD AUTO: 7.7 K/UL (ref 4.8–10.8)
WBC #/AREA URNS HPF: ABNORMAL /HPF

## 2024-05-03 LAB
BACTERIA UR CULT: ABNORMAL
BACTERIA UR CULT: ABNORMAL
SIGNIFICANT IND 70042: ABNORMAL
SITE SITE: ABNORMAL
SOURCE SOURCE: ABNORMAL

## 2024-05-06 LAB — ANNOTATION COMMENT IMP: NORMAL

## 2024-05-09 LAB
APCR PPP: 4.38 {RATIO}
APTT SCREEN TO CONFIRM RATIO: 0.95 {RATIO}
AT III ACT/NOR PPP CHRO: 77 % (ref 76–128)
B2 GLYCOPROT1 IGG SERPL IA-ACNC: <10 SGU
B2 GLYCOPROT1 IGM SERPL IA-ACNC: <10 SMU
CARDIOLIPIN IGG SER IA-ACNC: <10 GPL
CARDIOLIPIN IGM SER IA-ACNC: <10 MPL
CONFIRM DRVVT STA-STACLOT: NORMAL S
DRVVT SCREEN TO CONFIRM RATIO: 0.92 {RATIO}
DRVVT SCREEN TO CONFIRM RATIO: NORMAL {RATIO}
DRVVT/DRVVT CFM P DOAC NEUT NORM PPP-RTO: NORMAL {RATIO}
F5 P.R506Q BLD/T QL: NORMAL
HCYS SERPL-SCNC: 5 UMOL/L (ref 0–15)
HEPARIN NT PPP QL: NORMAL
LA 3 SCREEN W REFLEX-IMP: NORMAL
LMW HEPARIN IND PLT AB SER QL: NORMAL
MIXING DRVVT: NORMAL S
PROT C ACT/NOR PPP: 96 % (ref 83–168)
PROT S FREE AG ACT/NOR PPP IA: 46 % (ref 55–123)
PROTHROMBIN TIME: 13.3 S (ref 12–15.5)
SCREEN APTT: NORMAL S
THROMBIN TIME: NORMAL S

## 2024-05-21 LAB — F2 C.20210G>A GENO BLD/T: NEGATIVE

## 2024-07-01 ENCOUNTER — HOSPITAL ENCOUNTER (OUTPATIENT)
Dept: RADIOLOGY | Facility: MEDICAL CENTER | Age: 39
End: 2024-07-01
Attending: MIDWIFE, LAY
Payer: COMMERCIAL

## 2024-07-01 DIAGNOSIS — Z34.80 PRENATAL CARE OF MULTIGRAVIDA, ANTEPARTUM: ICD-10-CM

## 2024-07-01 PROCEDURE — 76805 OB US >/= 14 WKS SNGL FETUS: CPT

## 2024-11-24 ENCOUNTER — OFFICE VISIT (OUTPATIENT)
Dept: URGENT CARE | Facility: PHYSICIAN GROUP | Age: 39
End: 2024-11-24
Payer: COMMERCIAL

## 2024-11-24 VITALS
WEIGHT: 228 LBS | SYSTOLIC BLOOD PRESSURE: 126 MMHG | BODY MASS INDEX: 34.56 KG/M2 | RESPIRATION RATE: 16 BRPM | TEMPERATURE: 97.9 F | DIASTOLIC BLOOD PRESSURE: 80 MMHG | OXYGEN SATURATION: 98 % | HEART RATE: 91 BPM | HEIGHT: 68 IN

## 2024-11-24 DIAGNOSIS — N61.0 ACUTE MASTITIS: ICD-10-CM

## 2024-11-24 PROCEDURE — 99214 OFFICE O/P EST MOD 30 MIN: CPT

## 2024-11-24 PROCEDURE — 3074F SYST BP LT 130 MM HG: CPT

## 2024-11-24 PROCEDURE — 3079F DIAST BP 80-89 MM HG: CPT

## 2024-11-24 RX ORDER — DICLOXACILLIN SODIUM 500 MG/1
500 CAPSULE ORAL 4 TIMES DAILY
Qty: 40 CAPSULE | Refills: 0 | Status: SHIPPED | OUTPATIENT
Start: 2024-11-24 | End: 2024-12-04

## 2024-11-24 RX ORDER — CLOTRIMAZOLE 1 %
1 CREAM (GRAM) TOPICAL 2 TIMES DAILY
Qty: 113 G | Refills: 0 | Status: SHIPPED | OUTPATIENT
Start: 2024-11-24

## 2024-11-24 NOTE — PATIENT INSTRUCTIONS
Antibiotic: Dicloxacillin  Take medication four times a day for 10 days  Take with a meal.  Probiotics are advised      Antifungal: Clotrimazole cream  Apply to nipples and areolas twice a day  Remove antifungal medication prior to breastfeeding baby

## 2024-11-24 NOTE — PROGRESS NOTES
Subjective:   Joycelyn Awan is a 39 y.o. female who presents for Other (Thrush from breastfeeding x1 week )      HPI:    Patient presents urgent care with concerns of fungal mastitis.  States she has red and swollen nipples, areolas.  She also reports severe sharp shooting pain in her breast.  She states she had a possibly clogged right breast last week.  She is currently breast-feeding a 2-week-old baby.  She denies fever, chills, body aches.  Denies nausea, vomiting, diarrhea. She states she has a cracked right nipple. Denies drainage from the right breast.  She has been hand expressing breast milk due to pain with latching for the past 24 hours.    ROS As above in HPI    Medications:    No current outpatient medications on file prior to visit.     No current facility-administered medications on file prior to visit.        Allergies:   Patient has no known allergies.    Problem List:   Patient Active Problem List   Diagnosis    Anxiety    History of depression    History of abnormal cervical Pap smear    History of ovarian cyst    IUD (intrauterine device) in place    Cellulitis    Obesity (BMI 30.0-34.9)    Blood in stool    Left ear pain    Middle ear effusion, left        Surgical History:  Past Surgical History:   Procedure Laterality Date    IRRIGATION & DEBRIDEMENT GENERAL Right 8/4/2022    Procedure: IRRIGATION AND DEBRIDEMENT, WOUND;  Surgeon: Farooq Walsh M.D.;  Location: SURGERY [facility];  Service: Orthopedics       Past Social Hx:   Social History     Tobacco Use    Smoking status: Never    Smokeless tobacco: Never   Vaping Use    Vaping status: Never Used   Substance Use Topics    Alcohol use: Yes     Alcohol/week: 0.6 oz     Types: 1 Cans of beer per week     Comment: occ    Drug use: Not Currently          Problem list, medications, and allergies reviewed by myself today in Epic.     Objective:     /80 (BP Location: Right arm, Patient Position: Sitting, BP Cuff Size: Adult)   " Pulse 91   Temp 36.6 °C (97.9 °F) (Temporal)   Resp 16   Ht 1.727 m (5' 8\")   Wt 103 kg (228 lb)   SpO2 98%   BMI 34.67 kg/m²     Physical Exam  Vitals and nursing note reviewed. Exam conducted with a chaperone present.   Constitutional:       Appearance: Normal appearance.   Cardiovascular:      Rate and Rhythm: Normal rate and regular rhythm.      Heart sounds: Normal heart sounds.   Pulmonary:      Effort: Pulmonary effort is normal.      Breath sounds: Normal breath sounds.   Chest:   Breasts:     Right: Swelling and tenderness present. No nipple discharge.      Left: Swelling and tenderness present. No nipple discharge.      Comments: Bilateral nipples and areola are erythematous, edematous, warm to the touch. There is a large fissure along the right lateral nipple. No drainage is present.   Lymphadenopathy:      Upper Body:      Right upper body: No supraclavicular or axillary adenopathy.      Left upper body: No supraclavicular or axillary adenopathy.   Neurological:      Mental Status: She is alert.         Assessment/Plan:       Diagnosis and associated orders:   1. Acute mastitis  - dicloxacillin (DYNAPEN) 500 MG Cap; Take 1 Capsule by mouth 4 times a day for 10 days.  Dispense: 40 Capsule; Refill: 0  - clotrimazole (LOTRIMIN) 1 % Cream; Apply 1 Application topically 2 times a day.  Dispense: 113 g; Refill: 0        Comments/MDM:       Bilateral nipples and areolas are erythematous, edematous and warm to the touch concerning for bacterial infection.  Patient is more concerned about having fungal infection at this time. Possible fungal mastitis however will cover for bacterial infection as well.  Will start her on dicloxacillin and clotrimazole cream.  Patient is to remove antifungal cream from breast prior to breast-feeding her baby.  Patient's  is in clinic with her today with her sister, and patient does not have signs or symptoms of thrush currently.  Recommend follow-up with OB and her " neonates pediatrician.  Urgent care should symptoms fail to improve.  Reviewed mastitis with patient and printed instructions for her reviewed attachments with patient.       Return to clinic or go to ED if symptoms worsen or persist. Indications for ED discussed at length. Patient/Parent/Guardian voices understanding. Follow-up with your primary care provider in 3-5 days. Red flag symptoms discussed. All side effects of medication discussed including allergic response, GI upset, tendon injury, rash, sedation etc.    My total time spent caring for the patient on the day of the encounter was 31 minutes. This does not include time spent on separately billable procedures/tests.      Please note that this dictation was created using voice recognition software. I have made a reasonable attempt to correct obvious errors, but I expect that there are errors of grammar and possibly content that I did not discover before finalizing the note.    This note was electronically signed by LENORE Sanders

## 2025-03-17 ENCOUNTER — TELEPHONE (OUTPATIENT)
Dept: RADIOLOGY | Facility: MEDICAL CENTER | Age: 40
End: 2025-03-17

## 2025-03-17 ENCOUNTER — OFFICE VISIT (OUTPATIENT)
Dept: URGENT CARE | Facility: PHYSICIAN GROUP | Age: 40
End: 2025-03-17
Payer: COMMERCIAL

## 2025-03-17 VITALS
HEIGHT: 68 IN | SYSTOLIC BLOOD PRESSURE: 128 MMHG | OXYGEN SATURATION: 99 % | TEMPERATURE: 98 F | BODY MASS INDEX: 34.1 KG/M2 | HEART RATE: 84 BPM | RESPIRATION RATE: 18 BRPM | WEIGHT: 225 LBS | DIASTOLIC BLOOD PRESSURE: 77 MMHG

## 2025-03-17 DIAGNOSIS — N63.10 MASS OF RIGHT BREAST, UNSPECIFIED QUADRANT: ICD-10-CM

## 2025-03-17 PROCEDURE — 99214 OFFICE O/P EST MOD 30 MIN: CPT

## 2025-03-17 PROCEDURE — 3074F SYST BP LT 130 MM HG: CPT

## 2025-03-17 PROCEDURE — 3078F DIAST BP <80 MM HG: CPT

## 2025-03-17 NOTE — PROGRESS NOTES
Verbal consent was acquired by the patient to use [address] ambient listening note generation during this visit   Subjective:   Joycelyn Awan is a 39 y.o. female who presents for Breast Pain (Rt Breast she found a lump . Pt is currently breastfeeding her 4 month . )      HPI:  History of Present Illness  The patient is a 39-year-old female who presents for evaluation of right breast lump.    She reports the presence of a small, hard lump in her right breast, which she discovered while breastfeeding. She is currently breastfeeding her 4 months old and has been informed that an ultrasound is required prior to her first mammogram. She does not experience any associated discomfort, pain, fevers, chills, or body aches. She does not have a primary care physician or a women's health doctor at this time.       ROS per HPI    Medications:    Current Outpatient Medications on File Prior to Visit   Medication Sig Dispense Refill    clotrimazole (LOTRIMIN) 1 % Cream Apply 1 Application topically 2 times a day. 113 g 0     No current facility-administered medications on file prior to visit.        Allergies:   Patient has no known allergies.    Problem List:   Patient Active Problem List   Diagnosis    Anxiety    History of depression    History of abnormal cervical Pap smear    History of ovarian cyst    IUD (intrauterine device) in place    Cellulitis    Obesity (BMI 30.0-34.9)    Blood in stool    Left ear pain    Middle ear effusion, left        Surgical History:  Past Surgical History:   Procedure Laterality Date    IRRIGATION & DEBRIDEMENT GENERAL Right 8/4/2022    Procedure: IRRIGATION AND DEBRIDEMENT, WOUND;  Surgeon: Farooq Walsh M.D.;  Location: SURGERY [facility];  Service: Orthopedics       Past Social Hx:   Social History     Tobacco Use    Smoking status: Never    Smokeless tobacco: Never   Vaping Use    Vaping status: Never Used   Substance Use Topics    Alcohol use: Yes     Alcohol/week: 0.6 oz  "    Types: 1 Cans of beer per week     Comment: occ    Drug use: Not Currently          Problem list, medications, and allergies reviewed by myself today in Epic.     Objective:     /77 (BP Location: Right arm, Patient Position: Sitting, BP Cuff Size: Adult)   Pulse 84   Temp 36.7 °C (98 °F) (Temporal)   Resp 18   Ht 1.727 m (5' 8\")   Wt 102 kg (225 lb)   SpO2 99%   BMI 34.21 kg/m²     Physical Exam  Vitals and nursing note reviewed.   Constitutional:       General: She is not in acute distress.     Appearance: Normal appearance. She is normal weight. She is not ill-appearing or toxic-appearing.   HENT:      Head: Normocephalic and atraumatic.   Pulmonary:      Effort: Pulmonary effort is normal.   Chest:      Chest wall: Mass present.          Comments: Right breast: hard pea size lump to 4 o'clock position, no erythema, no swelling, no tenderness, no nipple discharge, no skin changes  Skin:     General: Skin is warm and dry.      Capillary Refill: Capillary refill takes less than 2 seconds.   Neurological:      General: No focal deficit present.      Mental Status: She is alert and oriented to person, place, and time. Mental status is at baseline.      Gait: Gait normal.   Psychiatric:         Mood and Affect: Mood normal.         Behavior: Behavior normal.         Thought Content: Thought content normal.         Judgment: Judgment normal.         Assessment/Plan:     Diagnosis and associated orders:   1. Mass of right breast, unspecified quadrant  - US-BREAST LIMITED-RIGHT; Future  - Referral to establish with PCP        Comments/MDM:   Pt is clinically stable at today's acute urgent care visit.  No acute distress noted. Appropriate for outpatient management at this time.     Assessment & Plan  1. Breast lump.  She reports a small, hard lump in her breast while currently breastfeeding. No associated pain, fever, chills, or body aches were noted. An ultrasound has been ordered to evaluate the lump. A " referral to a primary care provider has also been initiated for further monitoring and follow-up. If the ultrasound results are abnormal, she will need to follow up with the primary care provider for further management.            Discussed DDx, management options (risks,benefits, and alternatives to planned treatment), natural progression and supportive care.  Expressed understanding and the treatment plan was agreed upon. Questions were encouraged and answered   Return to urgent care prn if new or worsening sx or if there is no improvement in condition prn.    Educated in Red flags and indications to immediately call 911 or present to the Emergency Department.   Advised the patient to follow-up with the primary care physician for recheck, reevaluation, and consideration of further management.    I personally reviewed prior external notes and test results pertinent to today's visit.  I have independently reviewed and interpreted all diagnostics ordered during this urgent care acute visit.     Please note that this dictation was created using voice recognition software. I have made a reasonable attempt to correct obvious errors, but I expect that there are errors of grammar and possibly content that I did not discover before finalizing the note.    This note was electronically signed by LENORE Hewitt

## 2025-03-31 ENCOUNTER — HOSPITAL ENCOUNTER (OUTPATIENT)
Dept: RADIOLOGY | Facility: MEDICAL CENTER | Age: 40
End: 2025-03-31
Payer: COMMERCIAL

## 2025-03-31 DIAGNOSIS — N63.10 MASS OF RIGHT BREAST, UNSPECIFIED QUADRANT: ICD-10-CM

## 2025-03-31 PROCEDURE — 76642 ULTRASOUND BREAST LIMITED: CPT | Mod: RT

## 2025-08-29 ENCOUNTER — APPOINTMENT (OUTPATIENT)
Dept: URGENT CARE | Facility: PHYSICIAN GROUP | Age: 40
End: 2025-08-29
Payer: COMMERCIAL

## 2025-08-29 ENCOUNTER — OFFICE VISIT (OUTPATIENT)
Dept: URGENT CARE | Facility: PHYSICIAN GROUP | Age: 40
End: 2025-08-29
Payer: COMMERCIAL

## 2025-08-29 VITALS
HEART RATE: 98 BPM | WEIGHT: 215 LBS | TEMPERATURE: 98.3 F | HEIGHT: 68 IN | OXYGEN SATURATION: 96 % | DIASTOLIC BLOOD PRESSURE: 64 MMHG | RESPIRATION RATE: 16 BRPM | SYSTOLIC BLOOD PRESSURE: 118 MMHG | BODY MASS INDEX: 32.58 KG/M2

## 2025-08-29 DIAGNOSIS — J06.9 VIRAL URI WITH COUGH: Primary | ICD-10-CM

## 2025-08-29 ASSESSMENT — ENCOUNTER SYMPTOMS
COUGH: 1
FEVER: 0
CHILLS: 1

## 2025-08-29 ASSESSMENT — LIFESTYLE VARIABLES: HOW OFTEN DO YOU HAVE A DRINK CONTAINING ALCOHOL: NEVER

## (undated) DEVICE — GLOVE BIOGEL SZ 7.5 SURGICAL PF LTX - (50PR/BX 4BX/CA)

## (undated) DEVICE — LACTATED RINGERS INJ 1000 ML - (14EA/CA 60CA/PF)

## (undated) DEVICE — TOWEL STOP TIMEOUT SAFETY FLAG (40EA/CA)

## (undated) DEVICE — SUTURE 0 VICRYL PLUS CT-1 - 8 X 18 INCH (12/BX)

## (undated) DEVICE — DRAPE 36X28IN RAD CARM BND BG - (25/CA) O

## (undated) DEVICE — ELECTRODE DUAL RETURN W/ CORD - (50/PK)

## (undated) DEVICE — PADDING CAST 6 IN STERILE - 6 X 4 YDS (24/CA)

## (undated) DEVICE — SET LEADWIRE 5 LEAD BEDSIDE DISPOSABLE ECG (1SET OF 5/EA)

## (undated) DEVICE — SLEEVE, VASO, THIGH, MED

## (undated) DEVICE — BLADE SURGICAL #15 - (50/BX 3BX/CA)

## (undated) DEVICE — PACK LOWER EXTREMITY - (2/CA)

## (undated) DEVICE — CANISTER SUCTION 3000ML MECHANICAL FILTER AUTO SHUTOFF MEDI-VAC NONSTERILE LF DISP  (40EA/CA)

## (undated) DEVICE — TUBING CLEARLINK DUO-VENT - C-FLO (48EA/CA)

## (undated) DEVICE — SODIUM CHL IRRIGATION 0.9% 1000ML (12EA/CA)

## (undated) DEVICE — GLOVE BIOGEL INDICATOR SZ 8 SURGICAL PF LTX - (50/BX 4BX/CA)

## (undated) DEVICE — SET EXTENSION WITH 2 PORTS (48EA/CA) ***PART #2C8610 IS A SUBSTITUTE*****

## (undated) DEVICE — SENSOR OXIMETER ADULT SPO2 RD SET (20EA/BX)

## (undated) DEVICE — SUTURE ETHILON 2-0 FSLX 30 (36PK/BX)"

## (undated) DEVICE — SUTURE GENERAL

## (undated) DEVICE — GOWN WARMING STANDARD FLEX - (30/CA)

## (undated) DEVICE — BANDAGE ELASTIC 6 HONEYCOMB - 6X5YD LF (20/CA)"

## (undated) DEVICE — SUCTION INSTRUMENT YANKAUER BULBOUS TIP W/O VENT (50EA/CA)

## (undated) DEVICE — CHLORAPREP 26 ML APPLICATOR - ORANGE TINT(25/CA)

## (undated) DEVICE — SUTURE 2-0 VICRYL PLUS CT-1 - 8 X 18 INCH(12/BX)